# Patient Record
Sex: FEMALE | Race: WHITE | NOT HISPANIC OR LATINO
[De-identification: names, ages, dates, MRNs, and addresses within clinical notes are randomized per-mention and may not be internally consistent; named-entity substitution may affect disease eponyms.]

---

## 2017-12-20 ENCOUNTER — TRANSCRIPTION ENCOUNTER (OUTPATIENT)
Age: 62
End: 2017-12-20

## 2018-05-04 ENCOUNTER — APPOINTMENT (OUTPATIENT)
Dept: CARDIOLOGY | Facility: CLINIC | Age: 63
End: 2018-05-04

## 2018-05-04 VITALS
DIASTOLIC BLOOD PRESSURE: 65 MMHG | TEMPERATURE: 97.8 F | SYSTOLIC BLOOD PRESSURE: 111 MMHG | HEART RATE: 96 BPM | HEIGHT: 62 IN | OXYGEN SATURATION: 69 % | BODY MASS INDEX: 19.88 KG/M2 | WEIGHT: 108 LBS

## 2018-05-04 DIAGNOSIS — Z87.19 PERSONAL HISTORY OF OTHER DISEASES OF THE DIGESTIVE SYSTEM: ICD-10-CM

## 2018-05-04 DIAGNOSIS — Z87.891 PERSONAL HISTORY OF NICOTINE DEPENDENCE: ICD-10-CM

## 2018-05-04 DIAGNOSIS — Z78.9 OTHER SPECIFIED HEALTH STATUS: ICD-10-CM

## 2018-05-04 DIAGNOSIS — Z82.3 FAMILY HISTORY OF STROKE: ICD-10-CM

## 2018-05-04 DIAGNOSIS — Z86.39 PERSONAL HISTORY OF OTHER ENDOCRINE, NUTRITIONAL AND METABOLIC DISEASE: ICD-10-CM

## 2018-05-04 DIAGNOSIS — Z87.39 PERSONAL HISTORY OF OTHER DISEASES OF THE MUSCULOSKELETAL SYSTEM AND CONNECTIVE TISSUE: ICD-10-CM

## 2018-05-04 DIAGNOSIS — Z86.59 PERSONAL HISTORY OF OTHER MENTAL AND BEHAVIORAL DISORDERS: ICD-10-CM

## 2018-05-04 DIAGNOSIS — Z87.898 PERSONAL HISTORY OF OTHER SPECIFIED CONDITIONS: ICD-10-CM

## 2018-05-04 DIAGNOSIS — Z82.49 FAMILY HISTORY OF ISCHEMIC HEART DISEASE AND OTHER DISEASES OF THE CIRCULATORY SYSTEM: ICD-10-CM

## 2018-05-04 DIAGNOSIS — Z87.2 PERSONAL HISTORY OF DISEASES OF THE SKIN AND SUBCUTANEOUS TISSUE: ICD-10-CM

## 2018-05-14 PROBLEM — Z87.19 HISTORY OF IRRITABLE BOWEL SYNDROME: Status: RESOLVED | Noted: 2018-05-14 | Resolved: 2018-05-14

## 2018-05-14 PROBLEM — Z86.59 HISTORY OF ANXIETY: Status: RESOLVED | Noted: 2018-05-14 | Resolved: 2018-05-14

## 2018-05-14 PROBLEM — Z86.39 HISTORY OF GAUCHER DISEASE: Status: RESOLVED | Noted: 2018-05-14 | Resolved: 2018-05-14

## 2018-05-14 PROBLEM — Z87.891 FORMER SMOKER: Status: ACTIVE | Noted: 2018-05-14

## 2018-05-14 PROBLEM — Z87.39 HISTORY OF SCOLIOSIS: Status: RESOLVED | Noted: 2018-05-14 | Resolved: 2018-05-14

## 2018-05-14 PROBLEM — Z82.3 FAMILY HISTORY OF CEREBROVASCULAR ACCIDENT (CVA): Status: ACTIVE | Noted: 2018-05-14

## 2018-05-14 PROBLEM — Z87.2 HISTORY OF PSORIASIS: Status: RESOLVED | Noted: 2018-05-14 | Resolved: 2018-05-14

## 2018-05-14 PROBLEM — Z87.898 HISTORY OF FIBROCYSTIC DISEASE OF BREAST: Status: RESOLVED | Noted: 2018-05-14 | Resolved: 2018-05-14

## 2018-05-14 PROBLEM — Z78.9 SOCIAL ALCOHOL USE: Status: ACTIVE | Noted: 2018-05-14

## 2018-05-14 PROBLEM — Z82.49 FAMILY HISTORY OF CORONARY ARTERY DISEASE: Status: ACTIVE | Noted: 2018-05-14

## 2018-05-14 RX ORDER — ASPIRIN ENTERIC COATED TABLETS 81 MG 81 MG/1
81 TABLET, DELAYED RELEASE ORAL
Refills: 0 | Status: ACTIVE | COMMUNITY

## 2018-05-21 ENCOUNTER — NON-APPOINTMENT (OUTPATIENT)
Age: 63
End: 2018-05-21

## 2018-11-20 ENCOUNTER — APPOINTMENT (OUTPATIENT)
Dept: PHYSICAL MEDICINE AND REHAB | Facility: CLINIC | Age: 63
End: 2018-11-20
Payer: MEDICARE

## 2018-11-20 VITALS
SYSTOLIC BLOOD PRESSURE: 122 MMHG | HEIGHT: 62 IN | DIASTOLIC BLOOD PRESSURE: 84 MMHG | OXYGEN SATURATION: 97 % | WEIGHT: 106 LBS | HEART RATE: 91 BPM | BODY MASS INDEX: 19.51 KG/M2 | TEMPERATURE: 97.6 F

## 2018-11-20 PROCEDURE — 99203 OFFICE O/P NEW LOW 30 MIN: CPT

## 2019-01-14 ENCOUNTER — APPOINTMENT (OUTPATIENT)
Dept: CARDIOLOGY | Facility: CLINIC | Age: 64
End: 2019-01-14
Payer: MEDICARE

## 2019-01-14 VITALS
TEMPERATURE: 97.7 F | DIASTOLIC BLOOD PRESSURE: 76 MMHG | OXYGEN SATURATION: 100 % | HEART RATE: 78 BPM | WEIGHT: 106 LBS | SYSTOLIC BLOOD PRESSURE: 128 MMHG | BODY MASS INDEX: 19.51 KG/M2 | HEIGHT: 62 IN

## 2019-01-14 PROCEDURE — 99215 OFFICE O/P EST HI 40 MIN: CPT

## 2019-01-14 PROCEDURE — 93000 ELECTROCARDIOGRAM COMPLETE: CPT

## 2019-01-20 ENCOUNTER — NON-APPOINTMENT (OUTPATIENT)
Age: 64
End: 2019-01-20

## 2019-01-20 NOTE — PHYSICAL EXAM
[Auscultation Breath Sounds / Voice Sounds] : lungs were clear to auscultation bilaterally [Heart Sounds] : normal S1 and S2 [Bowel Sounds] : normal bowel sounds [Abdomen Soft] : soft [Cyanosis, Localized] : no localized cyanosis [] : no rash [Affect] : the affect was normal [FreeTextEntry1] : halting gait

## 2019-01-20 NOTE — HISTORY OF PRESENT ILLNESS
[FreeTextEntry1] : 63-year-old female\par Routine followup\par "I feel okay." Debra denies shortness of breath at rest, ankle edema, orthopnea, chest pain or palpitations. Dyspnea on exertion is unchanged from the past. Her main complaint is that of imbalance and decreased mobility.  The  12/18 the transthoracic echocardiogram demonstrated moderate mitral regurgitation with posterior leaflet prolapse. The mitral valve clip device was noted. Left ventricular ejection fraction was 55% .(See attached report)  Previous treatment with furosemide has been discontinued for unclear reasons possibly related to concerns for incontinence.

## 2019-02-01 ENCOUNTER — RECORD ABSTRACTING (OUTPATIENT)
Age: 64
End: 2019-02-01

## 2019-02-01 LAB — CYTOLOGY CVX/VAG DOC THIN PREP: NORMAL

## 2019-02-08 ENCOUNTER — APPOINTMENT (OUTPATIENT)
Dept: RHEUMATOLOGY | Facility: CLINIC | Age: 64
End: 2019-02-08
Payer: MEDICARE

## 2019-02-08 VITALS
DIASTOLIC BLOOD PRESSURE: 70 MMHG | BODY MASS INDEX: 19.51 KG/M2 | HEIGHT: 62 IN | SYSTOLIC BLOOD PRESSURE: 100 MMHG | WEIGHT: 106 LBS

## 2019-02-08 PROCEDURE — 36415 COLL VENOUS BLD VENIPUNCTURE: CPT

## 2019-02-08 PROCEDURE — 99213 OFFICE O/P EST LOW 20 MIN: CPT | Mod: 25

## 2019-02-09 LAB
25(OH)D3 SERPL-MCNC: 27.9 NG/ML
ALBUMIN SERPL ELPH-MCNC: 4.5 G/DL
ALP BLD-CCNC: 65 U/L
ALT SERPL-CCNC: 13 U/L
ANION GAP SERPL CALC-SCNC: 15 MMOL/L
AST SERPL-CCNC: 23 U/L
BASOPHILS # BLD AUTO: 0.04 K/UL
BASOPHILS NFR BLD AUTO: 0.8 %
BILIRUB SERPL-MCNC: 0.6 MG/DL
BUN SERPL-MCNC: 25 MG/DL
CALCIUM SERPL-MCNC: 10.2 MG/DL
CHLORIDE SERPL-SCNC: 99 MMOL/L
CO2 SERPL-SCNC: 25 MMOL/L
CREAT SERPL-MCNC: 0.74 MG/DL
EOSINOPHIL # BLD AUTO: 0.05 K/UL
EOSINOPHIL NFR BLD AUTO: 1 %
GLUCOSE SERPL-MCNC: 91 MG/DL
HCT VFR BLD CALC: 44.4 %
HGB BLD-MCNC: 13.3 G/DL
IMM GRANULOCYTES NFR BLD AUTO: 0 %
LYMPHOCYTES # BLD AUTO: 1.79 K/UL
LYMPHOCYTES NFR BLD AUTO: 35.6 %
MAN DIFF?: NORMAL
MCHC RBC-ENTMCNC: 27.5 PG
MCHC RBC-ENTMCNC: 30 GM/DL
MCV RBC AUTO: 91.7 FL
MONOCYTES # BLD AUTO: 0.33 K/UL
MONOCYTES NFR BLD AUTO: 6.6 %
NEUTROPHILS # BLD AUTO: 2.82 K/UL
NEUTROPHILS NFR BLD AUTO: 56 %
PLATELET # BLD AUTO: 167 K/UL
POTASSIUM SERPL-SCNC: 4.9 MMOL/L
PROT SERPL-MCNC: 7.4 G/DL
RBC # BLD: 4.84 M/UL
RBC # FLD: 16 %
SODIUM SERPL-SCNC: 139 MMOL/L
URATE SERPL-MCNC: 3.6 MG/DL
WBC # FLD AUTO: 5.03 K/UL

## 2019-02-09 NOTE — HISTORY OF PRESENT ILLNESS
[FreeTextEntry1] : 63 yo female here for f/u osteoporosis. She is taking calcium plus vitamin D when she remembers (about 4 times a week). She receives Prolia injections. No new fractures.  Her last Prolia 9/12/18\par A few weeks ago, she fell, and had 3 CT scans of her brain and xrays of her knees, which were negative.  She was badly bruised.  There was concern bc she was on a blood thinner.

## 2019-02-13 LAB
CALCIUM SERPL-MCNC: 10.2 MG/DL
COLLAGEN CTX SERPL-MCNC: 70 PG/ML
OSTEOCALCIN SERPL-MCNC: 11 NG/ML
PARATHYROID HORMONE INTACT: 42 PG/ML

## 2019-05-13 ENCOUNTER — NON-APPOINTMENT (OUTPATIENT)
Age: 64
End: 2019-05-13

## 2019-05-13 ENCOUNTER — APPOINTMENT (OUTPATIENT)
Dept: CARDIOLOGY | Facility: CLINIC | Age: 64
End: 2019-05-13
Payer: MEDICARE

## 2019-05-13 VITALS
BODY MASS INDEX: 19.39 KG/M2 | HEART RATE: 76 BPM | SYSTOLIC BLOOD PRESSURE: 128 MMHG | OXYGEN SATURATION: 96 % | WEIGHT: 106 LBS | DIASTOLIC BLOOD PRESSURE: 71 MMHG

## 2019-05-13 DIAGNOSIS — Z87.39 PERSONAL HISTORY OF OTHER DISEASES OF THE MUSCULOSKELETAL SYSTEM AND CONNECTIVE TISSUE: ICD-10-CM

## 2019-05-13 DIAGNOSIS — Z00.00 ENCOUNTER FOR GENERAL ADULT MEDICAL EXAMINATION W/OUT ABNORMAL FINDINGS: ICD-10-CM

## 2019-05-13 PROCEDURE — 93000 ELECTROCARDIOGRAM COMPLETE: CPT

## 2019-05-13 PROCEDURE — 99215 OFFICE O/P EST HI 40 MIN: CPT

## 2019-05-13 NOTE — PHYSICAL EXAM
[Auscultation Breath Sounds / Voice Sounds] : lungs were clear to auscultation bilaterally [Heart Sounds] : normal S1 and S2 [Bowel Sounds] : normal bowel sounds [Abdomen Soft] : soft [Cyanosis, Localized] : no localized cyanosis [] : no rash [Affect] : the affect was normal [FreeTextEntry1] : halting gait.  Bilateral upper chest /subclavicular area deep brain stimulation batteries palpable. Mild kyphosis

## 2019-05-13 NOTE — DISCUSSION/SUMMARY
[FreeTextEntry1] : \par Preoperative cardiovascular examination\par Debra underwent implantation of a deep brain stimulator for treatment of Parkinson's. Battery replacement is now recommended and planned for 5/17/19. The details of the evaluation neurological reports etc. are are not available for review. \par \par \par Comment\par There is no cardiac contraindication to the proposed surgical procedure. There has been no recent myocardial infarction, the patient is hemodynamically stable without clinical congestive heart failure. Atrial  fibrillation ventricular response is controlled.  As such, the operation may proceed with an acceptable cardiac risk.\par \par I have recommended the following:\par 1. Continue the present medical regimen\par 2. Reinstitution of aspirin as soon as feasible postoperatively as directed by Dr. Huitron\par 3  Electrocardiogram with brain stimulator turned off \par 4. Further cardiac testing is not required at this time\par 5. Workup and treatment as directed by \par \par \par Valvular heart disease\par In 12/17 Debra underwent transcatheter mitral valve repair with mitral valve clip for severe mitral regurgitation . 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 Posterior mitral annular calcification was noted. The procedure was difficult due to calcification and commissural extension to P1 and P2 prolapse. The 12/18 transthoracic echocardiogram demonstrated a reduction of severe mitral regurgitation to moderate levels. The left atrium was severely enlarged at 6.0 cm. The mitral valve clip device was noted. There was residual posterior leaflet prolapse. Moderate pulmonary hypertension was reflected by a pulmonary artery systolic pressure of 50  mmHg. The left ventricle ejection fraction was 55%. The present cardiac examination is consistent with this degree of mitral regurgitation and a euvolemic state New York Heart Association class II .Debra is at increased risk for pulmonary venous congestion.Low-dose diuretics  were not tolerated.\par \par I have recommended the following:\par 1.  continue the present medical regimen \par 2   Antibiotic prophylaxis for appropriate dental and surgical procedures\par 3   Echocardiogram with next office evaluation in 6 months\par 4. Followup with Dr. KATHRYN Aponte [facility]\par \par \par Atrial fibrillation\par The working diagnosis is chronic atrial fibrillation secondary to valvular ( mitral regurgitation) heart disease. Atrial fibrillation was first detected in 5/17 on a routine electrocardiogram. The last electrocardiogram demonstrating sinus rhythm was in 5/14. As such, the duration of atrial fibrillation is uncertain. The 12/18 echocardiogram demonstrated moderate mitral insufficiency with severe left atrial dilatation 6.0 cm) and severe increased volume index (363 mL). The duration of atrial fibrillation ,  degree of mitral disease coupled with the left atrial enlargement/increased volume would indicate a low likelihood of successful restoration and maintenance of sinus rhythm. Adequate rate control of the ventricular response appears to be achieved without the administration of AV osmin blocking agents . 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 A low "[id number]" score and concerns for the potential of trauma/falling make the risk of anticoagulation outweigh its potential benefit.\par \par I have recommended the following:\par 1. No attempt to restore/maintain sinus rhythm\par 2 Holter monitor/event recorder later 2019 to document adequate control of the ventricular response\par 3. Addition of beta blockers if required to control the ventricular response\par \par \par \par Hyperlipidemia\par Hyperlipidemia represents a risk factor for the development of atherosclerotic heart disease. However there is no evidence of such to date. A 10/05 stress sestamibi study was normal. The ejection fraction was 83%. A  2017 CT coronary angiogram prior to mitral valve clipping procedure for severe mitral regurgitation was normal. The target LDL level for primary prevention is about 100. The most recent lipid levels are not available for review. Nonpharmacologic therapy, specifically diet and exercise to the extent possible are emphasized as major aspects of treatment.\par \par I have recommended the following:\par 1 low-salt low-fat low-cholesterol diet. Regular aerobic exercise to the extent possible\par 2 laboratory studies including lipid profile through primary care Dr. PRIETO Aguero\par 3. Target LDL level to about 100 as discussed above

## 2019-05-13 NOTE — REVIEW OF SYSTEMS
[Feeling Fatigued] : feeling fatigued [see HPI] : see HPI [Negative] : Integumentary [Joint Pain] : no joint pain

## 2019-05-13 NOTE — HISTORY OF PRESENT ILLNESS
[FreeTextEntry1] : 64-year-old female\par Preoperative cardiovascular examination prior to battery  replacement of deep brain stimulator\par \par Debra previously underwent implantation of a deep brain stimulator for treatment of Parkinson's. Battery replacement is now recommended and planned for 5/17/19. The details of the evaluation neurological reports etc. are are not available for review. \par \par Mrs. Trujillo denies symptoms of palpitations shortness of breath ankle edema orthopnea or syncope.

## 2019-11-08 ENCOUNTER — APPOINTMENT (OUTPATIENT)
Dept: RHEUMATOLOGY | Facility: CLINIC | Age: 64
End: 2019-11-08
Payer: MEDICARE

## 2019-11-08 VITALS
BODY MASS INDEX: 19.51 KG/M2 | DIASTOLIC BLOOD PRESSURE: 62 MMHG | WEIGHT: 106 LBS | HEIGHT: 62 IN | SYSTOLIC BLOOD PRESSURE: 118 MMHG

## 2019-11-08 PROCEDURE — 36415 COLL VENOUS BLD VENIPUNCTURE: CPT

## 2019-11-08 PROCEDURE — 99213 OFFICE O/P EST LOW 20 MIN: CPT | Mod: 25

## 2019-11-11 NOTE — HISTORY OF PRESENT ILLNESS
[FreeTextEntry1] : 65 yo female here for f/u osteoporosis. She has not been taking calcium plus vitamin D.  She receives Prolia injections. Her last Prolia was 7 months ago.  No new fractures.

## 2019-11-11 NOTE — HISTORY OF PRESENT ILLNESS
[FreeTextEntry1] : 63 yo female here for f/u osteoporosis. She has not been taking calcium plus vitamin D.  She receives Prolia injections. Her last Prolia was 7 months ago.  No new fractures.

## 2019-11-12 ENCOUNTER — APPOINTMENT (OUTPATIENT)
Dept: CARDIOLOGY | Facility: CLINIC | Age: 64
End: 2019-11-12

## 2019-11-21 ENCOUNTER — APPOINTMENT (OUTPATIENT)
Dept: CARDIOLOGY | Facility: CLINIC | Age: 64
End: 2019-11-21
Payer: MEDICARE

## 2019-11-21 PROCEDURE — 93306 TTE W/DOPPLER COMPLETE: CPT

## 2019-11-30 LAB
25(OH)D3 SERPL-MCNC: 27.1 NG/ML
ALBUMIN SERPL ELPH-MCNC: 4.7 G/DL
ALP BLD-CCNC: 67 U/L
ALT SERPL-CCNC: 18 U/L
ANION GAP SERPL CALC-SCNC: 12 MMOL/L
AST SERPL-CCNC: 25 U/L
BASOPHILS # BLD AUTO: 0.05 K/UL
BASOPHILS NFR BLD AUTO: 1 %
BILIRUB SERPL-MCNC: 0.6 MG/DL
BUN SERPL-MCNC: 20 MG/DL
CALCIUM SERPL-MCNC: 9.9 MG/DL
CALCIUM SERPL-MCNC: 9.9 MG/DL
CHLORIDE SERPL-SCNC: 102 MMOL/L
CO2 SERPL-SCNC: 26 MMOL/L
COLLAGEN CTX SERPL-MCNC: 469 PG/ML
CREAT SERPL-MCNC: 0.67 MG/DL
EOSINOPHIL # BLD AUTO: 0.07 K/UL
EOSINOPHIL NFR BLD AUTO: 1.4 %
GLUCOSE SERPL-MCNC: 79 MG/DL
HCT VFR BLD CALC: 43.5 %
HGB BLD-MCNC: 13.6 G/DL
IMM GRANULOCYTES NFR BLD AUTO: 0.2 %
LYMPHOCYTES # BLD AUTO: 1.77 K/UL
LYMPHOCYTES NFR BLD AUTO: 34.2 %
MAN DIFF?: NORMAL
MCHC RBC-ENTMCNC: 29.2 PG
MCHC RBC-ENTMCNC: 31.3 GM/DL
MCV RBC AUTO: 93.5 FL
MONOCYTES # BLD AUTO: 0.36 K/UL
MONOCYTES NFR BLD AUTO: 6.9 %
NEUTROPHILS # BLD AUTO: 2.92 K/UL
NEUTROPHILS NFR BLD AUTO: 56.3 %
OSTEOCALCIN SERPL-MCNC: 16 NG/ML
PARATHYROID HORMONE INTACT: 48 PG/ML
PLATELET # BLD AUTO: 165 K/UL
POTASSIUM SERPL-SCNC: 4.7 MMOL/L
PROT SERPL-MCNC: 7.3 G/DL
RBC # BLD: 4.65 M/UL
RBC # FLD: 15.9 %
SODIUM SERPL-SCNC: 140 MMOL/L
URATE SERPL-MCNC: 3.9 MG/DL
WBC # FLD AUTO: 5.18 K/UL

## 2020-01-01 ENCOUNTER — TRANSCRIPTION ENCOUNTER (OUTPATIENT)
Age: 65
End: 2020-01-01

## 2020-01-07 ENCOUNTER — INBOUND DOCUMENT (OUTPATIENT)
Age: 65
End: 2020-01-07

## 2020-02-03 ENCOUNTER — APPOINTMENT (OUTPATIENT)
Dept: CARDIOLOGY | Facility: CLINIC | Age: 65
End: 2020-02-03
Payer: MEDICARE

## 2020-02-03 ENCOUNTER — NON-APPOINTMENT (OUTPATIENT)
Age: 65
End: 2020-02-03

## 2020-02-03 VITALS
BODY MASS INDEX: 18.84 KG/M2 | OXYGEN SATURATION: 99 % | HEART RATE: 87 BPM | WEIGHT: 103 LBS | DIASTOLIC BLOOD PRESSURE: 74 MMHG | SYSTOLIC BLOOD PRESSURE: 102 MMHG

## 2020-02-03 DIAGNOSIS — M19.90 UNSPECIFIED OSTEOARTHRITIS, UNSPECIFIED SITE: ICD-10-CM

## 2020-02-03 DIAGNOSIS — Z87.01 PERSONAL HISTORY OF PNEUMONIA (RECURRENT): ICD-10-CM

## 2020-02-03 DIAGNOSIS — Z86.59 PERSONAL HISTORY OF OTHER MENTAL AND BEHAVIORAL DISORDERS: ICD-10-CM

## 2020-02-03 DIAGNOSIS — Z86.39 PERSONAL HISTORY OF OTHER ENDOCRINE, NUTRITIONAL AND METABOLIC DISEASE: ICD-10-CM

## 2020-02-03 DIAGNOSIS — Z63.4 DISAPPEARANCE AND DEATH OF FAMILY MEMBER: ICD-10-CM

## 2020-02-03 PROCEDURE — 99215 OFFICE O/P EST HI 40 MIN: CPT

## 2020-02-03 PROCEDURE — 93000 ELECTROCARDIOGRAM COMPLETE: CPT

## 2020-02-03 SDOH — SOCIAL STABILITY - SOCIAL INSECURITY: DISSAPEARANCE AND DEATH OF FAMILY MEMBER: Z63.4

## 2020-02-04 PROBLEM — M19.90 OSTEOARTHRITIS: Status: RESOLVED | Noted: 2020-02-04 | Resolved: 2020-02-04

## 2020-02-04 PROBLEM — Z86.59 HISTORY OF DEMENTIA: Status: RESOLVED | Noted: 2020-02-04 | Resolved: 2020-02-04

## 2020-02-04 NOTE — PHYSICAL EXAM
[Auscultation Breath Sounds / Voice Sounds] : lungs were clear to auscultation bilaterally [Heart Sounds] : normal S1 and S2 [Bowel Sounds] : normal bowel sounds [Abdomen Soft] : soft [] : no rash [Cyanosis, Localized] : no localized cyanosis [Affect] : the affect was normal [FreeTextEntry1] : halting gait.  Bilateral upper chest /subclavicular area deep brain stimulation batteries palpable. Moderate kyphosis /scoliosis

## 2020-02-04 NOTE — HISTORY OF PRESENT ILLNESS
[FreeTextEntry1] : 64-year-old female\par Routine followup of hospital\par 1/20 hospitalization  for pneumonia responded to antibiotic therapy. Subsequently emergency room evaluation for head trauma requiring suturing of a laceration. Head CAT scan negative. ( See hospital reports/medical records.)\par \par Presently Debra complains of positional chest discomfort which she attributes to the deep brain stimulation pulse generator. Moving her chest from side to side causes discomfort. No exertional chest pain. No shortness of breath at rest. No ankle edema. No orthopnea. No syncope.

## 2020-02-04 NOTE — DISCUSSION/SUMMARY
[FreeTextEntry1] : Valvular heart disease\par In 12/17 Debra underwent transcatheter mitral valve repair with mitral valve clip for severe mitral regurgitation . 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 Posterior mitral annular calcification was noted. The procedure was difficult due to calcification and commissural extension to P1 and P2 prolapse. The 1/20  transthoracic echocardiogram demonstrated a reduction of severe mitral regurgitation to mild-moderate. The mitral valve clip was in place and appeared stable. Residual posterior mitral valve leaflet prolapse was noted. The left atrium was at 4.8 cm The left atrial volume was markedly increased at 160 mL/M2  The pulmonary artery systolic pressure was 35 mmHg. The left ventricular ejection fraction was 60%.  The present cardiac examination is consistent with this degree of mitral regurgitation and a euvolemic state New York Heart Association class II .Debra is at increased risk for pulmonary venous congestion.Low-dose diuretics  were not tolerated.\par \par I have recommended the following:\par 1.  continue the present medical regimen \par 2   Antibiotic prophylaxis for appropriate dental and surgical procedures\par 3   Follow  up with  Dr. KATHRYN Aponte [facility]\par \par \par \par Chest pain\par The working diagnosis is musculoskeletal chest pain related to the presence of the deep brain stimulator generator. The history is compelling for this diagnosis. The differential diagnosis would include myocardial ischemia/atherosclerotic heart disease. However, a  2017 CT coronary angiogram was normal. The resting 2/03/20 electrocardiogram although technically limited fails to demonstrate any evidence of myocardial ischemia or infarction.\par \par I have recommended the following\par 1. No further cardiac testing for this problem at this time.\par \par \par Atrial fibrillation\par The working diagnosis is chronic atrial fibrillation secondary to valvular ( mitral regurgitation) heart disease. Atrial fibrillation was first detected in 5/17 on a routine electrocardiogram. The last electrocardiogram demonstrating sinus rhythm was in 5/14. As such, the duration of atrial fibrillation is uncertain. The 12/18 echocardiogram demonstrated moderate mitral insufficiency with severe left atrial dilatation 6.0 cm) and severe increased volume index (363 mL). The duration of atrial fibrillation ,  degree of mitral disease coupled with the left atrial enlargement/increased volume would indicate a low likelihood of successful restoration and maintenance of sinus rhythm. Adequate rate control of the ventricular response appears to be achieved without the administration of AV osmin blocking agents . 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 A low "[id number]" score and concerns for the potential of trauma/falling make the risk of anticoagulation outweigh its potential benefit.\par \par I have recommended the following:\par 1. No attempt to restore/maintain sinus rhythm\par 2 Holter monitor/event recorder later 2020 to document adequate control of the ventricular response\par 3. Addition of beta blockers if required to control the ventricular response\par \par \par \par Hyperlipidemia\par Hyperlipidemia represents a risk factor for the development of atherosclerotic heart disease. However there is no evidence of such to date. A 10/05 stress sestamibi study was normal. The ejection fraction was 83%. A  2017 CT coronary angiogram prior to mitral valve clipping procedure for severe mitral regurgitation was normal. The target LDL level for primary prevention is about 100. The most recent lipid levels are not available for review. Nonpharmacologic therapy, specifically diet and exercise to the extent possible are emphasized as major aspects of treatment.\par \par I have recommended the following:\par 1 low-salt low-fat low-cholesterol diet. Regular aerobic exercise to the extent possible\par 2 laboratory studies including lipid profile through primary care Dr. PRIETO Aguero\par 3. Target LDL level to about 100 as discussed above\par \par \par The diagnosis, prognosis, risks, options and alternatives were explained at length to the patient. All questions were answered. Issues discussed included chest pain, atrial fibrillation, valvular heart disease and Parkinson's disease.\par \par Counseling and/or coordination of care\par Time was a significant factor for this patient encounter. Total time spent with the patient was 40 minutes. 50% of the time was devoted to counseling and/or coordination of care.

## 2020-07-08 ENCOUNTER — APPOINTMENT (OUTPATIENT)
Dept: RHEUMATOLOGY | Facility: CLINIC | Age: 65
End: 2020-07-08
Payer: MEDICARE

## 2020-07-08 PROCEDURE — 99213 OFFICE O/P EST LOW 20 MIN: CPT | Mod: 25

## 2020-07-08 PROCEDURE — 36415 COLL VENOUS BLD VENIPUNCTURE: CPT

## 2020-07-09 LAB
25(OH)D3 SERPL-MCNC: 35 NG/ML
ALBUMIN SERPL ELPH-MCNC: 5.1 G/DL
ALP BLD-CCNC: 66 U/L
ALT SERPL-CCNC: 20 U/L
ANION GAP SERPL CALC-SCNC: 12 MMOL/L
AST SERPL-CCNC: 33 U/L
BASOPHILS # BLD AUTO: 0.04 K/UL
BASOPHILS NFR BLD AUTO: 0.7 %
BILIRUB SERPL-MCNC: 0.7 MG/DL
BUN SERPL-MCNC: 23 MG/DL
CALCIUM SERPL-MCNC: 10.2 MG/DL
CALCIUM SERPL-MCNC: 10.2 MG/DL
CHLORIDE SERPL-SCNC: 101 MMOL/L
CO2 SERPL-SCNC: 26 MMOL/L
CREAT SERPL-MCNC: 0.71 MG/DL
EOSINOPHIL # BLD AUTO: 0.06 K/UL
EOSINOPHIL NFR BLD AUTO: 1.1 %
GLUCOSE SERPL-MCNC: 92 MG/DL
HCT VFR BLD CALC: 46.7 %
HGB BLD-MCNC: 14.2 G/DL
IMM GRANULOCYTES NFR BLD AUTO: 0.2 %
LYMPHOCYTES # BLD AUTO: 1.87 K/UL
LYMPHOCYTES NFR BLD AUTO: 34.1 %
MAN DIFF?: NORMAL
MCHC RBC-ENTMCNC: 28.9 PG
MCHC RBC-ENTMCNC: 30.4 GM/DL
MCV RBC AUTO: 95.1 FL
MONOCYTES # BLD AUTO: 0.36 K/UL
MONOCYTES NFR BLD AUTO: 6.6 %
NEUTROPHILS # BLD AUTO: 3.15 K/UL
NEUTROPHILS NFR BLD AUTO: 57.3 %
PARATHYROID HORMONE INTACT: 64 PG/ML
PLATELET # BLD AUTO: 161 K/UL
POTASSIUM SERPL-SCNC: 4.6 MMOL/L
PROT SERPL-MCNC: 7.6 G/DL
RBC # BLD: 4.91 M/UL
RBC # FLD: 16.3 %
SODIUM SERPL-SCNC: 139 MMOL/L
URATE SERPL-MCNC: 3.4 MG/DL
WBC # FLD AUTO: 5.49 K/UL

## 2020-07-09 NOTE — HISTORY OF PRESENT ILLNESS
[FreeTextEntry1] : 63 yo female here for f/u osteoporosis. She has been taking calcium plus vitamin D.  She receives Prolia injections. She is 2 months overdue for her Prolia.  No new fractures.  No falls.\par \par Her memory continues to deteriorate due to Parkinsons.  She follows with a cognitive specialist.

## 2020-07-11 LAB
COLLAGEN CTX SERPL-MCNC: 76 PG/ML
OSTEOCALCIN SERPL-MCNC: 11 NG/ML

## 2020-09-23 ENCOUNTER — APPOINTMENT (OUTPATIENT)
Dept: CARDIOLOGY | Facility: CLINIC | Age: 65
End: 2020-09-23
Payer: MEDICARE

## 2020-09-23 ENCOUNTER — APPOINTMENT (OUTPATIENT)
Dept: CARDIOLOGY | Facility: CLINIC | Age: 65
End: 2020-09-23

## 2020-09-23 VITALS
BODY MASS INDEX: 19.2 KG/M2 | WEIGHT: 105 LBS | OXYGEN SATURATION: 98 % | SYSTOLIC BLOOD PRESSURE: 98 MMHG | DIASTOLIC BLOOD PRESSURE: 80 MMHG | TEMPERATURE: 97.3 F | HEART RATE: 97 BPM

## 2020-09-23 PROCEDURE — 99215 OFFICE O/P EST HI 40 MIN: CPT

## 2020-09-24 RX ORDER — CHOLECALCIFEROL (VITAMIN D3) 25 MCG
TABLET ORAL
Refills: 0 | Status: ACTIVE | COMMUNITY

## 2020-09-24 RX ORDER — LEVODOPA AND CARBIDOPA 145; 36.25 MG/1; MG/1
36.25-145 CAPSULE, EXTENDED RELEASE ORAL
Refills: 0 | Status: ACTIVE | COMMUNITY

## 2020-09-24 NOTE — PHYSICAL EXAM
[Auscultation Breath Sounds / Voice Sounds] : lungs were clear to auscultation bilaterally [Heart Sounds] : normal S1 and S2 [Bowel Sounds] : normal bowel sounds [Abdomen Soft] : soft [Cyanosis, Localized] : no localized cyanosis [] : no rash [Affect] : the affect was normal [FreeTextEntry1] : halting gait.  Bilateral upper chest /subclavicular area deep brain stimulation batteries palpable. Moderate kyphosis /scoliosis

## 2020-09-24 NOTE — HISTORY OF PRESENT ILLNESS
[FreeTextEntry1] : 65-year-old female\par Unscheduled visit\par \steph Richardson was seen in the emergency room on 9/19/20 because of neck and chest tightness thought to be musculoskeletal in origin. A CAT scan of the chest was consistent with mild pulmonary venous congestion. (See hospital records.)\par \steph Richardson complains of dyspnea on exertion which she feels is new over the last several weeks. No ankle edema. No orthopnea. No palpitations. No syncope.

## 2020-09-24 NOTE — DISCUSSION/SUMMARY
[FreeTextEntry1] : Shortness of breath\par The working diagnosis is dyspnea secondary to congestive heart failure with preserved ejection fraction (60% 1/20 echocardiogram).  Underlying valvular heart disease/mitral regurgitation may  be responsible for the development of pulmonary venous congestion. The 9/20 chest CAT scan and and the  9/20 BNP level  elevated at 1426  are  consistent with this diagnosis.. The differential diagnosis would include dyspnea  secondary to increased energy requirements for exertion related to neurological issues. The present physical examination and the 9/20 chest x-ray  are  not suggestive of congestive heart failure.\par Prior administration of Lasix was not tolerated\par \par I have recommended the following\par 1. Torsemide 20 mg/day\par 2. Transthoracic echocardiogram\par 3 consideration for transesophageal echocardiogram dependent on the above evaluation and the  patient's clinical course\par \par \par Valvular heart disease\par In 12/17 Debra underwent transcatheter mitral valve repair with mitral valve clip for severe mitral regurgitation . 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 Posterior mitral annular calcification was noted. The procedure was difficult due to calcification and commissural extension to P1 and P2 prolapse. The 1/20  transthoracic echocardiogram demonstrated a reduction of severe mitral regurgitation to mild-moderate. The mitral valve clip was in place and appeared stable. Residual posterior mitral valve leaflet prolapse was noted. The left atrium was at 4.8 cm The left atrial volume was markedly increased at 160 mL/M2  The pulmonary artery systolic pressure was 35 mmHg. The left ventricular ejection fraction was 60%.  The present cardiac examination is consistent with this degree of mitral regurgitation and a euvolemic state New York Heart Association class II .Debra is at increased risk for pulmonary venous congestion.Low-dose diuretics  were not tolerated.\par \par I have recommended the following:\par 1.  continue the present medical regimen \par 2   Antibiotic prophylaxis for appropriate dental and surgical procedures\par 3. Transthoracic  echocardiogram\par 4 transesophageal echocardiogram dependent on the above and patient's clinical course\par 5   Follow  up with  Dr. KATHRYN Aponte [facility]\par \par \par \par Chest pain\par The working diagnosis is musculoskeletal chest pain related to the presence of the deep brain stimulator generator. The history is compelling for this diagnosis. The differential diagnosis would include myocardial ischemia/atherosclerotic heart disease. However, a  2017 CT coronary angiogram was normal. .\par \par I have recommended the following\par 1. No further cardiac testing for this problem at this time.\par \par \par Atrial fibrillation\par The working diagnosis is chronic atrial fibrillation secondary to valvular ( mitral regurgitation) heart disease. Atrial fibrillation was first detected in 5/17 on a routine electrocardiogram. The last electrocardiogram demonstrating sinus rhythm was in 5/14. As such, the duration of atrial fibrillation is uncertain. The 12/18 echocardiogram demonstrated moderate mitral insufficiency with severe left atrial dilatation 6.0 cm) and severe increased volume index (363 mL). The duration of atrial fibrillation ,  degree of mitral disease coupled with the left atrial enlargement/increased volume would indicate a low likelihood of successful restoration and maintenance of sinus rhythm. Adequate rate control of the ventricular response appears to be achieved without the administration of AV osmin blocking agents . 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 A low "[id number]" score and concerns for the potential of trauma/falling make the risk of anticoagulation outweigh its potential benefit.\par \par I have recommended the following:\par 1. No attempt to restore/maintain sinus rhythm\par 2 Holter monitor/event recorder  2021 to document adequate control of the ventricular response\par 3. Addition of beta blockers if required to control the ventricular response\par \par \par \par Hyperlipidemia\par Hyperlipidemia represents a risk factor for the development of atherosclerotic heart disease. However there is no evidence of such to date. A 10/05 stress sestamibi study was normal. The ejection fraction was 83%. A  2017 CT coronary angiogram prior to mitral valve clipping procedure for severe mitral regurgitation was normal. The target LDL level for primary prevention is about 100. The most recent lipid levels are not available for review. Nonpharmacologic therapy, specifically diet and exercise to the extent possible are emphasized as major aspects of treatment.\par \par I have recommended the following:\par 1 low-salt low-fat low-cholesterol diet. Regular aerobic exercise to the extent possible\par 2 laboratory studies including lipid profile through primary care Dr. PRIETO Aguero\par 3. Target LDL level to about 100 as discussed above\par \par \par The diagnosis, prognosis, risks, options and alternatives were explained at length to the patient. All questions were answered. Issues discussed included chest pain, atrial fibrillation, valvular heart disease  Parkinson's disease dyspnea and noninvasive cardiac testing.\par \par Counseling and/or coordination of care\par Time was a significant factor for this patient encounter. Total time spent with the patient was 40 minutes. 50% of the time was devoted to counseling and/or coordination of care.

## 2020-09-30 ENCOUNTER — APPOINTMENT (OUTPATIENT)
Dept: CARDIOLOGY | Facility: CLINIC | Age: 65
End: 2020-09-30

## 2020-10-05 ENCOUNTER — APPOINTMENT (OUTPATIENT)
Dept: CARDIOLOGY | Facility: CLINIC | Age: 65
End: 2020-10-05
Payer: MEDICARE

## 2020-10-05 PROCEDURE — 99442: CPT | Mod: 95

## 2020-10-13 ENCOUNTER — APPOINTMENT (OUTPATIENT)
Dept: CARDIOLOGY | Facility: CLINIC | Age: 65
End: 2020-10-13
Payer: MEDICARE

## 2020-10-13 ENCOUNTER — NON-APPOINTMENT (OUTPATIENT)
Age: 65
End: 2020-10-13

## 2020-10-13 PROCEDURE — 93306 TTE W/DOPPLER COMPLETE: CPT

## 2020-10-26 ENCOUNTER — APPOINTMENT (OUTPATIENT)
Dept: CARDIOLOGY | Facility: CLINIC | Age: 65
End: 2020-10-26
Payer: MEDICARE

## 2020-10-26 VITALS
HEART RATE: 88 BPM | OXYGEN SATURATION: 97 % | BODY MASS INDEX: 19.2 KG/M2 | WEIGHT: 105 LBS | SYSTOLIC BLOOD PRESSURE: 88 MMHG | TEMPERATURE: 97 F | DIASTOLIC BLOOD PRESSURE: 60 MMHG

## 2020-10-26 PROCEDURE — 99215 OFFICE O/P EST HI 40 MIN: CPT

## 2020-10-27 NOTE — DISCUSSION/SUMMARY
[FreeTextEntry1] : Shortness of breath\par The working diagnosis is dyspnea secondary to congestive heart failure with preserved ejection fraction (57% 10/20 echocardiogram).  Underlying valvular heart disease/mitral regurgitation is likely to  be responsible for the development of pulmonary venous congestion. The 9/20 chest CAT scan and and the  9/20 BNP level  elevated at 1426  are  consistent with this diagnosis.. The differential diagnosis would include dyspnea  secondary to increased energy requirements for exertion related to neurological issues. The present physical examination and the 9/20 chest x-ray  are  not suggestive of congestive heart failure.\par Prior administration of Lasix was not tolerated.Torsemide appears to be adequately tolerated at the present time\par Dr. Aponte/[facility]  felt that due to unfavorable anatomy she would not be a candidate for a repeat mitral valve clipping procedure.\par Debra's intolerance to medical interventions for heart failure and the severity of mitral regurgitation makes treatment challenging\par \par I have recommended the following\par 1.Dr. HARMAN Vargas /Structural heart disease [facility] "second opinion "\par 2 consideration for  transesophageal echocardiogram dependent on the above evaluation and the  patient's clinical course\par \par \par \par \par Valvular heart disease\par In 12/17 Debra underwent transcatheter mitral valve repair with mitral valve clip for severe mitral regurgitation . 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 Posterior mitral annular calcification was noted. The procedure was difficult due to calcification and commissural extension to P1 and P2 prolapse. The 10/20   transthoracic echocardiogram demonstrated  moderate -severe mitral regurgitation.. The mitral valve clip was in place and appeared stable. Residual posterior mitral valve leaflet prolapse was noted. The left atrium was 7.9 cm The left atrial volume was markedly increased at 198 mL/M2   Moderate pulmonary hypertension was noted. The pulmonary artery systolic pressure was 57 mmHg. The left ventricular ejection fraction was 57 %.  The present cardiac examination is consistent with this degree of mitral regurgitation and a euvolemic state New York Heart Association class II .Debra is at increased risk for pulmonary venous congestion.Low-dose diuretics  were not tolerated.\par \par I have recommended the following:\par 1.  continue the present medical regimen \par 2   Antibiotic prophylaxis for appropriate dental and surgical procedures\par 3. DIMA Vargas Structural heart disease [facility] second opinion as discussed above\par 4   consideration for  transesophageal echocardiogram dependent on the above and patient's clinical course\par l\par \par \par \par \par \par Atrial fibrillation\par The working diagnosis is chronic atrial fibrillation secondary to valvular ( mitral regurgitation) heart disease. Atrial fibrillation was first detected in 5/17 on a routine electrocardiogram. The last electrocardiogram demonstrating sinus rhythm was in 5/14. As such, the duration of atrial fibrillation is uncertain. The 12/18 echocardiogram demonstrated moderate mitral insufficiency with severe left atrial dilatation 6.0 cm) and severe increased volume index (363 mL). The duration of atrial fibrillation ,  degree of mitral disease coupled with the left atrial enlargement/increased volume would indicate a low likelihood of successful restoration and maintenance of sinus rhythm. Adequate rate control of the ventricular response appears to be achieved without the administration of AV osmin blocking agents . 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 A low "[id number]" score and concerns for the potential of trauma/falling make the risk of anticoagulation outweigh its potential benefit.\par \par I have recommended the following:\par 1.Continue present medical regimen\par 2 Holter monitor/event recorder  2021 to document adequate control of the ventricular response\par 3. Addition of beta blockers if required to control the ventricular response\par \par \par \par Hyperlipidemia\par Hyperlipidemia represents a risk factor for the development of atherosclerotic heart disease. However there is no evidence of such to date. A 10/05 stress sestamibi study was normal. The ejection fraction was 83%. A  2017 CT coronary angiogram prior to mitral valve clipping procedure for severe mitral regurgitation was normal. The target LDL level for primary prevention is about 100. The most recent lipid levels are not available for review. Nonpharmacologic therapy, specifically diet and exercise to the extent possible are emphasized as major aspects of treatment.\par \par I have recommended the following:\par 1 low-salt low-fat low-cholesterol diet. Regular aerobic exercise to the extent possible\par 2 laboratory studies including lipid profile through primary care Dr. PRIETO Aguero\par 3. Target LDL level to about 100 as discussed above\par \par \par The diagnosis, prognosis, risks, options and alternatives were explained at length to the patient and her family (son and sister). All questions were answered. Issues discussed included  dyspnea , atrial fibrillation, valvular heart disease  Parkinson's disease   noninvasive cardiac testing  and surgical/nonsurgical treatments for mitral regurgitation\par \par Counseling and/or coordination of care\par Time was a significant factor for this patient encounter. Total time spent with the patient was 40 minutes. 50% of the time was devoted to counseling and/or coordination of care.

## 2020-10-27 NOTE — HISTORY OF PRESENT ILLNESS
[FreeTextEntry1] : 65-year-old female\par Routine followup\par \par Debra was evaluated at [facility]/Dr. Aponte. Repeat mitral valve clipping procedures were not felt to be feasible. Dyspnea on exertion may be slightly improved now following the administration of diuretic therapy. Breath complains of profound fatigue\par \par Ms. Wilkerson is accompanied today by her sister

## 2020-11-05 ENCOUNTER — APPOINTMENT (OUTPATIENT)
Dept: CARDIOLOGY | Facility: CLINIC | Age: 65
End: 2020-11-05

## 2020-11-10 ENCOUNTER — APPOINTMENT (OUTPATIENT)
Dept: CARDIOLOGY | Facility: CLINIC | Age: 65
End: 2020-11-10

## 2020-11-20 ENCOUNTER — APPOINTMENT (OUTPATIENT)
Dept: CARDIOTHORACIC SURGERY | Facility: CLINIC | Age: 65
End: 2020-11-20
Payer: MEDICARE

## 2020-11-20 ENCOUNTER — NON-APPOINTMENT (OUTPATIENT)
Age: 65
End: 2020-11-20

## 2020-11-20 VITALS
WEIGHT: 101 LBS | SYSTOLIC BLOOD PRESSURE: 92 MMHG | BODY MASS INDEX: 18.47 KG/M2 | HEART RATE: 103 BPM | TEMPERATURE: 97 F | DIASTOLIC BLOOD PRESSURE: 78 MMHG | OXYGEN SATURATION: 97 %

## 2020-11-20 PROCEDURE — 99204 OFFICE O/P NEW MOD 45 MIN: CPT

## 2020-11-25 NOTE — HISTORY OF PRESENT ILLNESS
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 The procedure was difficult due to calcification and commissural extension to P1 and P2 prolapse and one clip was placed. Prior to the MitraClip procedure, the patient does not recall have any significant shortness of breath with exertion and also denies any CHF admissions. After the procedure, the patient states it took about two months for her to return to her previous activity level. \par \par From a cardiology standpoint, the patient was doing well until September when she began to experience shortness of breath with exertion. She was admitted to [facility] that month and started on diuretics. Since then, she continues to experience SOB with exertion. The patient denies chest pain, orthopnea, PND, dizziness, syncope, LE edema and palpitations. She recently followed up with Dr. Aponte at [facility] regarding further treatment of her valve disease and was told it would be high risk for a second intervention. \par \par From a Parkinson's perspective, the patient has had some decline over the past several months. She does not use a cane or walker for ambulation as she feels it makes walking more cumbersome. She has a HHA 24/7 that assists with dressing and bathing. About two weeks ago, she had a fall at home after becoming unbalanced and hit her back; she denies hitting her head.\par \par Neurologist Dr. Susan Richardson [facility] \par Local Neurologist Dr. Sarah Mendoza

## 2020-11-25 NOTE — PHYSICAL EXAM
[General Appearance - In No Acute Distress] : no acute distress [Normal Conjunctiva] : the conjunctiva exhibited no abnormalities [Normal Oral Mucosa] : normal oral mucosa [Normal Jugular Venous V Waves Present] : normal jugular venous V waves present [Heart Rate And Rhythm] : heart rate and rhythm were normal [Heart Sounds] : normal S1 and S2 [Edema] : no peripheral edema present [] : no respiratory distress [Respiration, Rhythm And Depth] : normal respiratory rhythm and effort [Exaggerated Use Of Accessory Muscles For Inspiration] : no accessory muscle use [Auscultation Breath Sounds / Voice Sounds] : lungs were clear to auscultation bilaterally [Bowel Sounds] : normal bowel sounds [Abdomen Soft] : soft [Abdomen Tenderness] : non-tender [Nail Clubbing] : no clubbing of the fingernails [Cyanosis, Localized] : no localized cyanosis [Skin Color & Pigmentation] : normal skin color and pigmentation [Oriented To Time, Place, And Person] : oriented to person, place, and time [FreeTextEntry1] : Shuffling gait.

## 2020-11-25 NOTE — REVIEW OF SYSTEMS
[Feeling Fatigued] : feeling fatigued [Dyspnea on exertion] : dyspnea during exertion [Joint Stiffness] : joint stiffness [Tremor] : a tremor was seen [Negative] : Psychiatric [Fever] : no fever [Headache] : no headache [Chills] : no chills [Shortness Of Breath] : no shortness of breath [Chest  Pressure] : no chest pressure [Chest Pain] : no chest pain [Lower Ext Edema] : no extremity edema [Leg Claudication] : no intermittent leg claudication [Palpitations] : no palpitations [Joint Pain] : no joint pain [Joint Swelling] : no joint swelling [Muscle Cramps] : no muscle cramps [Limb Weakness (Paresis)] : no limb weakness [Dizziness] : no dizziness [Numbness (Hypesthesia)] : no numbness [Convulsions] : no convulsions [Tingling (Paresthesia)] : no tingling

## 2020-11-30 ENCOUNTER — APPOINTMENT (OUTPATIENT)
Dept: CARDIOLOGY | Facility: CLINIC | Age: 65
End: 2020-11-30

## 2020-12-30 ENCOUNTER — APPOINTMENT (OUTPATIENT)
Dept: CARDIOLOGY | Facility: CLINIC | Age: 65
End: 2020-12-30
Payer: MEDICARE

## 2020-12-30 PROCEDURE — 99442: CPT | Mod: 95

## 2021-01-06 ENCOUNTER — APPOINTMENT (OUTPATIENT)
Dept: RHEUMATOLOGY | Facility: CLINIC | Age: 66
End: 2021-01-06
Payer: MEDICARE

## 2021-01-06 PROCEDURE — 99443: CPT | Mod: 95

## 2021-01-13 NOTE — HISTORY OF PRESENT ILLNESS
[Home] : at home, [unfilled] , at the time of the visit. [Other Location: e.g. Home (Enter Location, City,State)___] : at [unfilled] [Verbal consent obtained from patient] : the patient, [unfilled] [Other:____] : [unfilled] [FreeTextEntry1] : She has been taking calcium plus vitamin D. She receives Prolia injections.  Last Prolia 8/25/21.  No new fractures.   She has fallen a few times.\par

## 2021-01-16 ENCOUNTER — RESULT REVIEW (OUTPATIENT)
Age: 66
End: 2021-01-16

## 2021-01-18 ENCOUNTER — FORM ENCOUNTER (OUTPATIENT)
Age: 66
End: 2021-01-18

## 2021-01-19 ENCOUNTER — OUTPATIENT (OUTPATIENT)
Dept: OUTPATIENT SERVICES | Facility: HOSPITAL | Age: 66
LOS: 1 days | End: 2021-01-19
Payer: MEDICARE

## 2021-01-19 DIAGNOSIS — I34.0 NONRHEUMATIC MITRAL (VALVE) INSUFFICIENCY: ICD-10-CM

## 2021-01-19 PROCEDURE — 93320 DOPPLER ECHO COMPLETE: CPT | Mod: 26

## 2021-01-19 PROCEDURE — 93312 ECHO TRANSESOPHAGEAL: CPT

## 2021-01-19 PROCEDURE — 93312 ECHO TRANSESOPHAGEAL: CPT | Mod: 26

## 2021-01-19 PROCEDURE — 76377 3D RENDER W/INTRP POSTPROCES: CPT | Mod: 26

## 2021-01-25 ENCOUNTER — APPOINTMENT (OUTPATIENT)
Dept: CARDIOTHORACIC SURGERY | Facility: CLINIC | Age: 66
End: 2021-01-25
Payer: MEDICARE

## 2021-01-25 PROCEDURE — 99213 OFFICE O/P EST LOW 20 MIN: CPT | Mod: 95

## 2021-02-08 ENCOUNTER — APPOINTMENT (OUTPATIENT)
Dept: CARDIOLOGY | Facility: CLINIC | Age: 66
End: 2021-02-08
Payer: MEDICARE

## 2021-02-08 VITALS
TEMPERATURE: 97 F | BODY MASS INDEX: 18.29 KG/M2 | HEART RATE: 83 BPM | WEIGHT: 100 LBS | SYSTOLIC BLOOD PRESSURE: 100 MMHG | OXYGEN SATURATION: 100 % | DIASTOLIC BLOOD PRESSURE: 80 MMHG

## 2021-02-08 PROCEDURE — 99215 OFFICE O/P EST HI 40 MIN: CPT

## 2021-02-09 RX ORDER — RIVASTIGMINE 13.3 MG/24H
13.3 PATCH, EXTENDED RELEASE TRANSDERMAL
Refills: 0 | Status: ACTIVE | COMMUNITY

## 2021-02-09 RX ORDER — TRAZODONE HYDROCHLORIDE 50 MG/1
50 TABLET ORAL
Qty: 30 | Refills: 0 | Status: ACTIVE | COMMUNITY
Start: 2021-01-29

## 2021-02-09 NOTE — HISTORY OF PRESENT ILLNESS
[FreeTextEntry1] : 66-year-old female\par Routine followup\par Dyspnea on exertion is unchanged from the recent past. No ankle edema. No orthopnea. No chest pain. No palpitations. No syncope.\par \par Debra is accompanied by her sister. His son is glistening and on the phone

## 2021-02-09 NOTE — PHYSICAL EXAM
[Auscultation Breath Sounds / Voice Sounds] : lungs were clear to auscultation bilaterally [Heart Sounds] : normal S1 and S2 [Bowel Sounds] : normal bowel sounds [Abdomen Soft] : soft [Cyanosis, Localized] : no localized cyanosis [] : no rash [Affect] : the affect was normal [FreeTextEntry1] : pleasant

## 2021-02-09 NOTE — DISCUSSION/SUMMARY
[FreeTextEntry1] : \par Valvular heart disease\par In 12/17 Debra underwent transcatheter mitral valve repair with mitral valve clip for severe mitral regurgitation . 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 Posterior mitral annular calcification was noted. The procedure was difficult due to calcification and commissural extension to P1 and P2 prolapse. The 10/20   transthoracic echocardiogram demonstrated  moderate -severe mitral regurgitation.. The mitral valve clip was in place and appeared stable. Residual posterior mitral valve leaflet prolapse was noted. The left atrium was 7.9 cm The left atrial volume was markedly increased at 198 mL/M2   Moderate pulmonary hypertension was noted. The pulmonary artery systolic pressure was 57 mmHg. The left ventricular ejection fraction was 57 %.   A 1/21 transesophageal echocardiogram demonstrated bileaflet prolapse. A mitral clip at A2/P2 creating a double orifice mitral valve was seen. Severe mitral regurgitation was noted. The largest mitral regurgitant jet was from the lateral orifice. An iatrogenic intra-atrial shunt was noted left to right. Biatrial enlargement was seen. Mild pulmonary hypertension was reflected by pulmonary artery systolic pressure 37 mmHg. The left ventricle ejection fraction was 55%  The present cardiac examination is consistent with this degree of mitral regurgitation and a euvolemic state New York Heart Association class II -III .\par \par Comment\par The patient's noncardiac medical problems including Parkinson's disease and  dementia are  major influences on management decisions.\par In view of the stable symptoms, preserved left ventricular systolic function and lack of pulmonary venous congestion continued medical management is most attractive at this time. \par \par I have recommended the following:\par 1.  continue the present medical regimen \par 2   Antibiotic prophylaxis for appropriate dental and surgical procedures\par 3. No further cardiac testing for this problem at this time\par 4  Transthoracic echocardiogram with next office evaluation in 6 months \par \par \par \par \par \par \par Atrial fibrillation\par The working diagnosis is chronic atrial fibrillation secondary to valvular ( mitral regurgitation) heart disease. Atrial fibrillation was first detected in 5/17 on a routine electrocardiogram. The last electrocardiogram demonstrating sinus rhythm was in 5/14. As such, the duration of atrial fibrillation is uncertain. The  10/20 echocardiogram demonstrated moderate -severe mitral insufficiency with severe left atrial dilatation 7.9 cm) and severe increased volume index (199 mL).   The 1/21 transesophageal echocardiogram also demonstrated severe mitral regurgitation and biatrial enlargement.  The duration of atrial fibrillation ,  degree of mitral disease coupled with the left atrial enlargement/increased volume would indicate a low likelihood of successful restoration and maintenance of sinus rhythm. Adequate rate control of the ventricular response appears to be achieved without the administration of AV osmin blocking agents . 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 A low "[id number]" score and concerns for the potential of trauma/falling make the risk of anticoagulation outweigh its potential benefit.\par \par I have recommended the following:\par 1.Continue present medical regimen\par 2 Consideration for  Holter monitor/event recorder  2021 to document adequate control of the ventricular response\par 3. Addition of beta blockers if required to control the ventricular response\par \par \par \par Hyperlipidemia\par Hyperlipidemia represents a risk factor for the development of atherosclerotic heart disease. However there is no evidence of such to date. A 10/05 stress sestamibi study was normal. The ejection fraction was 83%. A  2017 CT coronary angiogram prior to mitral valve clipping procedure for severe mitral regurgitation was normal. The target LDL level for primary prevention is about 100. The most recent lipid levels are not available for review. Nonpharmacologic therapy, specifically diet and exercise to the extent possible are emphasized as major aspects of treatment.\par \par I have recommended the following:\par 1 low-salt low-fat low-cholesterol diet. Regular aerobic exercise to the extent possible\par 2 laboratory studies including lipid profile through primary care Dr. PRIETO Aguero\par 3. Target LDL level to about 100 as discussed above\par \par \par The diagnosis, prognosis, risks, options and alternatives were explained at length to the patient and her family (son and sister). All questions were answered. Issues discussed included  dyspnea , atrial fibrillation, valvular heart disease  Parkinson's disease   noninvasive cardiac testing  and surgical/nonsurgical treatments for mitral regurgitation\par \par Counseling and/or coordination of care\par Time was a significant factor for this patient encounter. Total time spent with the patient was 40 minutes. 50% of the time was devoted to counseling and/or coordination of care.

## 2021-02-09 NOTE — REVIEW OF SYSTEMS
[Feeling Fatigued] : feeling fatigued [see HPI] : see HPI [Joint Pain] : no joint pain [Negative] : Integumentary

## 2021-02-17 ENCOUNTER — APPOINTMENT (OUTPATIENT)
Dept: RHEUMATOLOGY | Facility: CLINIC | Age: 66
End: 2021-02-17
Payer: MEDICARE

## 2021-02-17 PROCEDURE — 36415 COLL VENOUS BLD VENIPUNCTURE: CPT

## 2021-02-18 LAB
25(OH)D3 SERPL-MCNC: 38.4 NG/ML
ALBUMIN SERPL ELPH-MCNC: 5 G/DL
ALP BLD-CCNC: 86 U/L
ALT SERPL-CCNC: 11 U/L
ANION GAP SERPL CALC-SCNC: 12 MMOL/L
AST SERPL-CCNC: 23 U/L
BASOPHILS # BLD AUTO: 0.04 K/UL
BASOPHILS NFR BLD AUTO: 0.7 %
BILIRUB SERPL-MCNC: 0.7 MG/DL
BUN SERPL-MCNC: 26 MG/DL
CALCIUM SERPL-MCNC: 10.7 MG/DL
CALCIUM SERPL-MCNC: 10.7 MG/DL
CHLORIDE SERPL-SCNC: 100 MMOL/L
CO2 SERPL-SCNC: 27 MMOL/L
CREAT SERPL-MCNC: 0.8 MG/DL
EOSINOPHIL # BLD AUTO: 0.14 K/UL
EOSINOPHIL NFR BLD AUTO: 2.5 %
GLUCOSE SERPL-MCNC: 86 MG/DL
HCT VFR BLD CALC: 46.5 %
HGB BLD-MCNC: 14.4 G/DL
IMM GRANULOCYTES NFR BLD AUTO: 0.2 %
LYMPHOCYTES # BLD AUTO: 1.67 K/UL
LYMPHOCYTES NFR BLD AUTO: 30.1 %
MAN DIFF?: NORMAL
MCHC RBC-ENTMCNC: 28.6 PG
MCHC RBC-ENTMCNC: 31 GM/DL
MCV RBC AUTO: 92.4 FL
MONOCYTES # BLD AUTO: 0.36 K/UL
MONOCYTES NFR BLD AUTO: 6.5 %
NEUTROPHILS # BLD AUTO: 3.32 K/UL
NEUTROPHILS NFR BLD AUTO: 60 %
PARATHYROID HORMONE INTACT: 52 PG/ML
PLATELET # BLD AUTO: 180 K/UL
POTASSIUM SERPL-SCNC: 4.7 MMOL/L
PROT SERPL-MCNC: 7.9 G/DL
RBC # BLD: 5.03 M/UL
RBC # FLD: 18.5 %
SODIUM SERPL-SCNC: 139 MMOL/L
URATE SERPL-MCNC: 3.8 MG/DL
WBC # FLD AUTO: 5.54 K/UL

## 2021-02-19 LAB — OSTEOCALCIN SERPL-MCNC: 8.4 NG/ML

## 2021-02-22 LAB — COLLAGEN CTX SERPL-MCNC: 299 PG/ML

## 2021-02-25 ENCOUNTER — APPOINTMENT (OUTPATIENT)
Dept: RHEUMATOLOGY | Facility: CLINIC | Age: 66
End: 2021-02-25

## 2021-07-09 ENCOUNTER — APPOINTMENT (OUTPATIENT)
Dept: CARDIOLOGY | Facility: CLINIC | Age: 66
End: 2021-07-09
Payer: MEDICARE

## 2021-07-09 PROCEDURE — 99441: CPT | Mod: 95

## 2021-08-03 ENCOUNTER — APPOINTMENT (OUTPATIENT)
Dept: CARDIOLOGY | Facility: CLINIC | Age: 66
End: 2021-08-03
Payer: MEDICARE

## 2021-08-03 PROCEDURE — 93306 TTE W/DOPPLER COMPLETE: CPT

## 2021-08-09 ENCOUNTER — APPOINTMENT (OUTPATIENT)
Dept: CARDIOLOGY | Facility: CLINIC | Age: 66
End: 2021-08-09
Payer: MEDICARE

## 2021-08-09 VITALS
HEIGHT: 62 IN | TEMPERATURE: 98.3 F | BODY MASS INDEX: 16.82 KG/M2 | DIASTOLIC BLOOD PRESSURE: 69 MMHG | OXYGEN SATURATION: 99 % | SYSTOLIC BLOOD PRESSURE: 123 MMHG | HEART RATE: 89 BPM | WEIGHT: 91.38 LBS

## 2021-08-09 DIAGNOSIS — Z87.81 PERSONAL HISTORY OF (HEALED) TRAUMATIC FRACTURE: ICD-10-CM

## 2021-08-09 PROCEDURE — 99214 OFFICE O/P EST MOD 30 MIN: CPT

## 2021-08-09 NOTE — HISTORY OF PRESENT ILLNESS
[FreeTextEntry1] : 66-year-old female\par Routine followup\par Discharge exertion is unchanged from in the past. No chest pain. No ankle edema. No orthopnea. No syncope.\par \par Debra  is accompanied today by her sister. Her son is available on speaker phone

## 2021-08-09 NOTE — REVIEW OF SYSTEMS
[Feeling Fatigued] : feeling fatigued [Dyspnea on exertion] : dyspnea during exertion [Joint Pain] : joint pain [Negative] : Heme/Lymph [FreeTextEntry3] : glasses [FreeTextEntry5] : see history of present illness

## 2021-08-24 ENCOUNTER — APPOINTMENT (OUTPATIENT)
Dept: CARDIOTHORACIC SURGERY | Facility: CLINIC | Age: 66
End: 2021-08-24
Payer: MEDICARE

## 2021-08-24 PROCEDURE — 99203 OFFICE O/P NEW LOW 30 MIN: CPT | Mod: 95

## 2021-08-25 NOTE — REASON FOR VISIT
[Consultation] : a consultation visit [Home] : at home, [unfilled] , at the time of the visit. [Medical Office: ([facility])___] : at the medical office located in  [Family Member] : family member [Spouse] : spouse [Verbal consent obtained from patient] : the patient, [unfilled]

## 2021-08-27 NOTE — END OF VISIT
[Time Spent: ___ minutes] : I have spent [unfilled] minutes of time on the encounter. [FreeTextEntry3] : I, JERAMY LARSEN , am scribing for and in the presence of SUKHDEEP DORADO the following sections: History of present illness, past Medical/family/surgical/family/social history, review of systems, vital signs, physical exam and disposition.\par I personally performed the services described in the documentation, reviewed the documentation recorded by the scribe in my presence and it accurately and completely records my words and actions.\par

## 2021-08-27 NOTE — ASSESSMENT
[FreeTextEntry1] : 65 yo female presents with PMH of AF(not on AC s/t fall risk), Gaucher disease, former smoker, Parkinsons Disease s/p deep brain stimulator, depression, dementia, osteoporosis, scoliosis, acute on chronic diastolic heart failure and mitral valve regurgitation s/p mitral clip on 12/17/17 @ [facility] with residual  MR. Dr. Diana reviewed the echocardiogram images and reports with patient and her family and discussed the case with Dr. Galvan. Dr. Diana deems her high risk for surgery s/t severe parkinson's disease and co morbidities. All questions addressed.  The patient and family do not want to proceed with surgery. She will continue medical management and follow up with Dr. Galvan.

## 2021-08-27 NOTE — DATA REVIEWED
[FreeTextEntry1] : 8/3/21 Echo: \par 1. normal LV size and systolic function, LEF 56%\par 2. severely increased LA volume index\par 3. severely dilated right atrium\par 4. left to right interatrial shunt is noted and likely due to prior mitral valve clip procedure\par 5. mild AR\par 6. prolapse of posterior mitral leaflet with moderate to severe anteriorly directed MR. MV clip is noted.\par 7. mild TR\par \par 1/19/21 SUSANA: \par  1. Normal left and right ventricular size and systolic function.\par  2. Bileaflet prolapse with billowing of the posterior leaflet. There is a Mitraclip at A2/P2 creating a double orifice mitral valve. Severe mitral regurgitation seen at a blood pressure of 90/55 mmHg. Largest mitral regurgitation jet origin appears to be from the lateral orifice.\par  3. There is an iatrogenic interatrial shunt noted which is predominantly left-to-right.\par  4. Biatrial enlargement.\par  5. No LA/RA/BOBY/RAA thrombus seen.\par  6. Mild tricuspid regurgitation.\par  7. Pulmonary artery systolic pressure is 37 mmHg.\par  8. No pericardial effusion.\par  9. No prior echo is available for comparison.

## 2021-08-27 NOTE — HISTORY OF PRESENT ILLNESS
[FreeTextEntry1] : 65 yo female presents with PMH of AF(not on AC s/t fall risk), Gaucher disease, former smoker, Parkinsons Disease s/p deep brain stimulator, depression, dementia, osteoporosis, scoliosis, acute on chronic diastolic heart failure and mitral valve regurgitation s/p mitral clip on 12/17/17 @ [facility] with residual  MR. \par \par Patient presents via tele health for surgical consult with Dr. Diana. She appears extremely frail and report PHAN when walking 1 block. Symptoms are unchanged for the past 12 months. She is able to perform ADLs independently. \par \par

## 2021-10-26 ENCOUNTER — APPOINTMENT (OUTPATIENT)
Dept: GASTROENTEROLOGY | Facility: CLINIC | Age: 66
End: 2021-10-26
Payer: MEDICARE

## 2021-10-26 VITALS
HEART RATE: 78 BPM | OXYGEN SATURATION: 97 % | WEIGHT: 91 LBS | TEMPERATURE: 99.1 F | HEIGHT: 62 IN | SYSTOLIC BLOOD PRESSURE: 100 MMHG | BODY MASS INDEX: 16.75 KG/M2 | DIASTOLIC BLOOD PRESSURE: 70 MMHG

## 2021-10-26 DIAGNOSIS — Z12.11 ENCOUNTER FOR SCREENING FOR MALIGNANT NEOPLASM OF COLON: ICD-10-CM

## 2021-10-26 PROCEDURE — 99204 OFFICE O/P NEW MOD 45 MIN: CPT

## 2021-10-27 PROBLEM — Z12.11 COLON CANCER SCREENING: Status: ACTIVE | Noted: 2021-10-27

## 2021-10-27 NOTE — HISTORY OF PRESENT ILLNESS
[FreeTextEntry1] : 66 year old F with HLD, chronic afib not on AC, h/o anxiety/.depression, Parkinson's dementia/deep brain stimulation, , Gaucher disease, hyperparathyroidism, osteoporosis, OA, h/o IBS, hospitalized 01/2020 for pna, s/p mitral valve clipping procedure,  severe MR, mild pulm htn, NYHA Class II- III,  scoliosis, fam hx CRC - father- dx age 60 presents for evaluation of colon cancer screening. \par She is seen at the request of Dr. Candace Aguero. \par \par \par two prior colonoscopies- \par 1st one had inadequate preparation\par 2nd one with Dr. Shelton- 05/2017- for fam hx CRC, h/o polyps, \par procedure report not available. \par path report showed hepatic flexure tubular adenoma, snare, gross description- 0.3 cm in greatest dimension\par she does not know recall. \par \par she continues to suffer from chronic constipation and abdominal distension which she reports has been chronic for her whole life .She has bm QOD. no brbpr/melena. \par  \par PHAN when walks 1 block, She lives at home with 24 hour aid\par hemorrhoids since childbirth- no bleeding/ itching/pain, \par \par Patient denies, n/v/heartburn, reflux, dysphagia/odynophagia, change in bowel habits, diarrhea,  brbpr, melena. no weight loss.  Good appetite and energy level. Patient has daily BM, denies regular NSAID use. \par \par labs 02/2021- normal cbc , cmet\par

## 2021-10-27 NOTE — ASSESSMENT
[FreeTextEntry1] : Screening colonoscopy, prior records not available, however would recommend a 5 year recall based on available records with repeat colonoscopy in 05/2022\par \par Risks (including but not limited to sedation risks, infection, bleeding, perforation, possibility of missed lesions), benefits and alternatives to procedure, including not doing the procedure, were discussed with patient. Patient understood and agreed to proceed with colonoscopy. \par Colonoscopy preparation instructions reviewed with patient.\par \par Explained to patient that cardiac clearance would be required prior to procedure. \par Patient requested I speak with her son who was not available during today's visit. Will try to contact him to discuss procedure. \par

## 2021-10-27 NOTE — PHYSICAL EXAM
[General Appearance - Alert] : alert [General Appearance - In No Acute Distress] : in no acute distress [Sclera] : the sclera and conjunctiva were normal [Outer Ear] : the ears and nose were normal in appearance [Neck Appearance] : the appearance of the neck was normal [] : no respiratory distress [Abdomen Soft] : soft [Abdomen Tenderness] : non-tender [Abnormal Walk] : normal gait [Skin Color & Pigmentation] : normal skin color and pigmentation [Oriented To Time, Place, And Person] : oriented to person, place, and time [FreeTextEntry1] : deferred

## 2021-11-24 ENCOUNTER — APPOINTMENT (OUTPATIENT)
Dept: CARDIOTHORACIC SURGERY | Facility: CLINIC | Age: 66
End: 2021-11-24
Payer: MEDICARE

## 2021-11-24 PROCEDURE — 99214 OFFICE O/P EST MOD 30 MIN: CPT | Mod: 95

## 2021-12-13 ENCOUNTER — APPOINTMENT (OUTPATIENT)
Dept: CARDIOLOGY | Facility: CLINIC | Age: 66
End: 2021-12-13
Payer: MEDICARE

## 2021-12-13 PROCEDURE — 99443: CPT | Mod: 95

## 2021-12-17 ENCOUNTER — APPOINTMENT (OUTPATIENT)
Dept: CARDIOLOGY | Facility: CLINIC | Age: 66
End: 2021-12-17
Payer: MEDICARE

## 2021-12-17 PROCEDURE — 99443: CPT | Mod: 95

## 2022-02-01 ENCOUNTER — APPOINTMENT (OUTPATIENT)
Dept: CARDIOLOGY | Facility: CLINIC | Age: 67
End: 2022-02-01
Payer: MEDICARE

## 2022-02-01 VITALS
BODY MASS INDEX: 17.3 KG/M2 | DIASTOLIC BLOOD PRESSURE: 58 MMHG | HEIGHT: 62 IN | WEIGHT: 94 LBS | OXYGEN SATURATION: 99 % | SYSTOLIC BLOOD PRESSURE: 107 MMHG | HEART RATE: 82 BPM

## 2022-02-01 VITALS
SYSTOLIC BLOOD PRESSURE: 107 MMHG | HEART RATE: 82 BPM | TEMPERATURE: 98.6 F | BODY MASS INDEX: 17.3 KG/M2 | HEIGHT: 62 IN | WEIGHT: 94 LBS | DIASTOLIC BLOOD PRESSURE: 58 MMHG | OXYGEN SATURATION: 99 %

## 2022-02-01 PROCEDURE — 99215 OFFICE O/P EST HI 40 MIN: CPT

## 2022-02-02 NOTE — DISCUSSION/SUMMARY
[FreeTextEntry1] : \par Valvular heart disease\par In 12/17 Debra underwent transcatheter mitral valve repair with mitral valve clip for severe mitral regurgitation . 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 Posterior mitral annular calcification was noted. The procedure was difficult due to calcification and commissural extension to P1 and P2 prolapse. \par   A 1/21 transesophageal echocardiogram demonstrated bileaflet prolapse. A mitral clip at A2/P2 creating a double orifice mitral valve was seen. Severe mitral regurgitation was noted. The largest mitral regurgitant jet was from the lateral orifice. An iatrogenic intra-atrial shunt was noted left to right. Biatrial enlargement was seen. Mild pulmonary hypertension was reflected by pulmonary artery systolic pressure 37 mmHg. The left ventricle ejection fraction was 55% .\par The 8/21 transthoracic echocardiogram demonstrated prolapse of the posterior mitral valve leaflet with moderate to severe mitral regurgitation. The mitral valve clip was noted. The left atrial volume index was severely increased at 300 mL/M2 with marked left atrial enlargement at 6.1 cm. The pulmonary systolic pressure was normal at 30 mmHg. The left ventricular  ejection fraction was 56%\par  The present cardiac examination is consistent with this degree of mitral regurgitation and a euvolemic state New York Heart Association class II -III . A second mitral valve clipping procedure would be technically difficult and probably not provide hemodynamic benefit..\par \par Comment\par The patient's noncardiac medical problems including Parkinson's disease and  dementia are  major influences on management decisions.  Diuretics have not been tolerated and have affected best  Debra's quality of life.  Dr. Vargas felt that the patient might be a candidate for a mitral NITZA removal and compassionate use of the Tendyne valve\par . \par \par I have recommended the following:\par 1.  discontinue torsemide  \par 2   Antibiotic prophylaxis for appropriate dental and surgical procedures\par 3   Followup with Dr. Vargas\par 4.   Await "second opinion" Dr. PRIETO OBANDO\par 5  Transthoracic echocardiogram with next office evaluation in 6/22\par \par \par \par \par \par \par Atrial fibrillation\par The working diagnosis is chronic atrial fibrillation secondary to valvular ( mitral regurgitation) heart disease. Atrial fibrillation was first detected in 5/17 on a routine electrocardiogram. The last electrocardiogram demonstrating sinus rhythm was in 5/14. As such, the duration of atrial fibrillation is uncertain. The  10/20 echocardiogram demonstrated moderate -severe mitral insufficiency with severe left atrial dilatation 7.9 cm) and severe increased volume index (199 mL).   The 1/21 transesophageal echocardiogram also demonstrated severe mitral regurgitation and biatrial enlargement.  The duration of atrial fibrillation ,  degree of mitral disease coupled with the left atrial enlargement/increased volume would indicate a low likelihood of successful restoration and maintenance of sinus rhythm. Adequate rate control of the ventricular response appears to be achieved without the administration of AV osmin blocking agents . 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 A low "[id number]" score and concerns for the potential of trauma/falling make the risk of anticoagulation outweigh its potential benefit.\par \par I have recommended the following:\par 1.Continue present medical regimen\par 2 Consideration for  Holter monitor/event recorder  2021 to document adequate control of the ventricular response\par 3. Addition of beta blockers if required to control the ventricular response\par \par \par \par Hyperlipidemia\par Hyperlipidemia represents a risk factor for the development of atherosclerotic heart disease. However there is no evidence of such to date. A 10/05 stress sestamibi study was normal. The ejection fraction was 83%. A  2017 CT coronary angiogram prior to mitral valve clipping procedure for severe mitral regurgitation was normal. The target LDL level for primary prevention is about 100. The most recent lipid levels are not available for review. Nonpharmacologic therapy, specifically diet and exercise to the extent possible are emphasized as major aspects of treatment.\par \par I have recommended the following:\par 1 low-salt low-fat low-cholesterol diet. Regular aerobic exercise to the extent possible\par 2 laboratory studies including lipid profile through primary care Dr. PRIETO Aguero\par 3. Target LDL level to about 100 as discussed above\par \par \par The diagnosis, prognosis, risks, options and alternatives were explained at length to the patient and her family (son and sister). All questions were answered. Issues discussed included  dyspnea , atrial fibrillation, valvular heart disease  Parkinson's disease   noninvasive cardiac testing  and surgical/nonsurgical treatments for mitral regurgitation\par \par Counseling and/or coordination of care\par Time was a significant factor for this patient encounter. Total time spent with the patient was  40   minutes. 50% of the time was devoted to counseling and/or coordination of care.

## 2022-02-02 NOTE — HISTORY OF PRESENT ILLNESS
[FreeTextEntry1] : 66-year-old female\par Routine followup\par Debra complains of increasing dyspnea on exertion and most bitterly of urinary frequency urgency and incontinence which she attributes the administration of Lasix.\par \par Debra's son participated in this visit by telephone\par The patient and her family are planning to seek a second opinion regarding management through Dr. PRIETO Ledezma [facility]

## 2022-02-02 NOTE — REVIEW OF SYSTEMS
[Feeling Fatigued] : feeling fatigued [Hearing Loss] : hearing loss [SOB] : shortness of breath [Dyspnea on exertion] : dyspnea during exertion [Joint Pain] : joint pain [Confusion] : confusion was observed [Depression] : depression [Anxiety] : anxiety [Negative] : Heme/Lymph [FreeTextEntry5] : see history of present illness [FreeTextEntry8] : urinary frequency/incontinence/urgency

## 2022-03-21 ENCOUNTER — TRANSCRIPTION ENCOUNTER (OUTPATIENT)
Age: 67
End: 2022-03-21

## 2022-05-10 ENCOUNTER — TRANSCRIPTION ENCOUNTER (OUTPATIENT)
Age: 67
End: 2022-05-10

## 2022-06-14 ENCOUNTER — TRANSCRIPTION ENCOUNTER (OUTPATIENT)
Age: 67
End: 2022-06-14

## 2022-08-15 ENCOUNTER — RESULT REVIEW (OUTPATIENT)
Age: 67
End: 2022-08-15

## 2022-08-16 ENCOUNTER — APPOINTMENT (OUTPATIENT)
Dept: CARDIOLOGY | Facility: CLINIC | Age: 67
End: 2022-08-16

## 2022-08-16 VITALS
SYSTOLIC BLOOD PRESSURE: 100 MMHG | BODY MASS INDEX: 17.38 KG/M2 | HEART RATE: 117 BPM | DIASTOLIC BLOOD PRESSURE: 90 MMHG | WEIGHT: 95 LBS | OXYGEN SATURATION: 92 %

## 2022-08-16 DIAGNOSIS — Z86.79 PERSONAL HISTORY OF OTHER DISEASES OF THE CIRCULATORY SYSTEM: ICD-10-CM

## 2022-08-16 DIAGNOSIS — Z86.59 PERSONAL HISTORY OF OTHER MENTAL AND BEHAVIORAL DISORDERS: ICD-10-CM

## 2022-08-16 PROCEDURE — 99215 OFFICE O/P EST HI 40 MIN: CPT

## 2022-08-21 PROBLEM — Z86.79 HISTORY OF CHRONIC ATRIAL FIBRILLATION: Status: RESOLVED | Noted: 2018-05-14 | Resolved: 2022-08-21

## 2022-08-21 PROBLEM — Z86.59 HISTORY OF DEPRESSION: Status: RESOLVED | Noted: 2018-05-14 | Resolved: 2022-08-21

## 2022-08-21 RX ORDER — CARBIDOPA AND LEVODOPA 25; 100 MG/1; MG/1
25-100 TABLET, EXTENDED RELEASE ORAL
Refills: 0 | Status: ACTIVE | COMMUNITY
Start: 2020-10-20

## 2022-08-21 NOTE — HISTORY OF PRESENT ILLNESS
[FreeTextEntry1] : 67-year-old female\par Unscheduled visit\par Patient called complaining of dyspnea and left leg pain.  She was evaluated this morning at the [place] emergency room.   At the present time complains only of left leg pain and generalized weakness. No chest pain. No shortness of breath at rest. No orthopnea. No paroxysmal nocturnal dyspnea\par

## 2022-08-21 NOTE — REVIEW OF SYSTEMS
[Feeling Fatigued] : feeling fatigued [Joint Pain] : joint pain [Depression] : depression [Anxiety] : anxiety [Negative] : Heme/Lymph [FreeTextEntry5] : see history of present illness [de-identified] : severe parkinsons

## 2022-08-21 NOTE — DISCUSSION/SUMMARY
[FreeTextEntry1] : Shortness of breath\par The working diagnosis dyspnea secondary to increased energy requirements related to Parkinson's disease with decreased mobility. The history is consistent with this diagnosis. The differential diagnosis would include congestive heart failure secondary to valvular heart disease (mitral regurgitation)  The 8/16 portable chest x-ray was read as mild pulmonary vascular congestion. However the   physical findings with clear lung fields absent x-ray  neck vein distention and lack of peripheral edema  argue against acute pulmonary venous congestion. The 8/15/22 chest x-ray with PA and lateral projections failed to demonstrate evidence of heart failure. To my review the 8/16/22 portable chest x-ray fails to demonstrate any significant pulmonary venous congestion. The difference in readings is in large part related to technical limitations of the portable chest x-ray.\par \par I have recommended the following\par a. Continue the present medical regimen\par b. Await structural heart disease evaluation through Dr. PRIETO OBANDO\par \par \par \par \par \par Valvular heart disease\par In 12/17 Debra underwent transcatheter mitral valve repair with mitral valve clip for severe mitral regurgitation . 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 Posterior mitral annular calcification was noted. The procedure was difficult due to calcification and commissural extension to P1 and P2 prolapse. \par   A 1/21 transesophageal echocardiogram demonstrated bileaflet prolapse. A mitral clip at A2/P2 creating a double orifice mitral valve was seen. Severe mitral regurgitation was noted. The largest mitral regurgitant jet was from the lateral orifice. An iatrogenic intra-atrial shunt was noted left to right. Biatrial enlargement was seen. Mild pulmonary hypertension was reflected by pulmonary artery systolic pressure 37 mmHg. The left ventricle ejection fraction was 55% .\par The 8/21 transthoracic echocardiogram demonstrated prolapse of the posterior mitral valve leaflet with moderate to severe mitral regurgitation. The mitral valve clip was noted. The left atrial volume index was severely increased at 300 mL/M2 with marked left atrial enlargement at 6.1 cm. The pulmonary systolic pressure was normal at 30 mmHg. The left ventricular  ejection fraction was 56%\par  The present cardiac examination is consistent with this degree of mitral regurgitation and a euvolemic state New [place] Heart Association class II -III . A second mitral valve clipping procedure is thought to be  technically difficult and probably not provide hemodynamic benefit..\par \par Comment\par The patient's noncardiac medical problems including Parkinson's disease and  dementia are  major influences on management decisions.  Diuretics have not been tolerated and have affected best  Debra's quality of life.  Dr. Vargas felt that the patient might be a candidate for a mitral NITZA removal and compassionate use of the Tendyne valve\par Most recently Debra has been evaluated by Dr. PRIETO OBANDO. The medical records echocardiographic reports etc. are not available for review\par . \par \par I have recommended the following:\par 1.  Antibiotic prophylaxis for appropriate dental and surgical procedures\par 2   Await "second opinion" Dr. PRIETO OBANDO\par 3  Transthoracic echocardiogram through Dr. Ledezma\par \par \par \par \par Atrial fibrillation\par The working diagnosis is chronic atrial fibrillation secondary to valvular ( mitral regurgitation) heart disease. Atrial fibrillation was first detected in 5/17 on a routine electrocardiogram. The last electrocardiogram demonstrating sinus rhythm was in 5/14. As such, the duration of atrial fibrillation is uncertain. The  10/20 echocardiogram demonstrated moderate -severe mitral insufficiency with severe left atrial dilatation 7.9 cm) and severe increased volume index (199 mL).   The 1/21 transesophageal echocardiogram also demonstrated severe mitral regurgitation and biatrial enlargement.  The duration of atrial fibrillation ,  degree of mitral disease coupled with the left atrial enlargement/increased volume would indicate a low likelihood of successful restoration and maintenance of sinus rhythm. Adequate rate control of the ventricular response appears to be achieved without the administration of AV osmin blocking agents . 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 A low "[id number]" score and concerns for the potential of trauma/falling make the risk of anticoagulation outweigh its potential benefit.\par \par I have recommended the following:\par 1.Continue present medical regimen\par 2 Consideration for  Zio patch  monitor/event recorder  2023  to document adequate control of the ventricular response\par 3. Addition of beta blockers if required to control the ventricular response\par \par \par \par Hyperlipidemia\par Hyperlipidemia represents a risk factor for the development of atherosclerotic heart disease. However there is no evidence of such to date. A 10/05 stress sestamibi study was normal. The ejection fraction was 83%. A  2017 CT coronary angiogram prior to mitral valve clipping procedure for severe mitral regurgitation was normal. The target LDL level for primary prevention is about 100. The most recent lipid levels are not available for review. Nonpharmacologic therapy, specifically diet and exercise to the extent possible are emphasized as major aspects of treatment.\par \par I have recommended the following:\par 1 low-salt low-fat low-cholesterol diet. Regular aerobic exercise to the extent possible\par 2 laboratory studies including lipid profile through primary care Dr. PRIETO Aguero\par 3. Target LDL level to about 100 as discussed above\par \par \par The diagnosis, prognosis, risks, options and alternatives were explained at length to the patient and her family (son ). All questions were answered. Issues discussed included  dyspnea , atrial fibrillation, valvular heart disease  Parkinson's disease   noninvasive cardiac testing  and surgical/nonsurgical treatments for mitral regurgitation\par \par Counseling and/or coordination of care\par Time was a significant factor for this patient encounter. Total time spent with the patient was  40   minutes. 50% of the time was devoted to counseling and/or coordination of care.

## 2022-10-06 ENCOUNTER — NON-APPOINTMENT (OUTPATIENT)
Age: 67
End: 2022-10-06

## 2022-10-31 ENCOUNTER — APPOINTMENT (OUTPATIENT)
Dept: CARDIOLOGY | Facility: CLINIC | Age: 67
End: 2022-10-31

## 2022-10-31 VITALS
HEART RATE: 66 BPM | DIASTOLIC BLOOD PRESSURE: 55 MMHG | HEIGHT: 62 IN | WEIGHT: 95 LBS | BODY MASS INDEX: 17.48 KG/M2 | OXYGEN SATURATION: 100 % | SYSTOLIC BLOOD PRESSURE: 86 MMHG

## 2022-10-31 PROCEDURE — 99215 OFFICE O/P EST HI 40 MIN: CPT

## 2022-11-01 RX ORDER — SELEGILINE HYDROCHLORIDE 5 MG/1
5 CAPSULE ORAL
Refills: 0 | Status: ACTIVE | COMMUNITY

## 2022-11-01 RX ORDER — SERTRALINE 25 MG/1
TABLET, FILM COATED ORAL
Refills: 0 | Status: ACTIVE | COMMUNITY

## 2022-11-01 NOTE — DISCUSSION/SUMMARY
[FreeTextEntry1] : Valvular heart disease\par In 12/17 Debra underwent transcatheter mitral valve repair with mitral valve clip for severe mitral regurgitation . 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 Posterior mitral annular calcification was noted. The procedure was difficult due to calcification and commissural extension to P1 and P2 prolapse. \par \par In 10/22  Debra  underwent a second mitral valve clipping procedure by Dr. PRIETO Ledezma [facility]. The post procedure transesophageal echocardiogram revealed moderate mitral regurgitation . 2 small iatrogenic atrial septal defects with left to right shunting were seen. The mean gradient across the atrial septal defect decreased from 11 mmHg to 4 mmHg supporting significant decrease in left atrial pressure following the new mitral valve clip. The left ventricular ejection fraction was 60%.\par   \par  The present cardiac examination is consistent with this degree of mitral regurgitation and a euvolemic state New York Heart Association class II -III . \par \par . \par \par I have recommended the following:\par 1.  Antibiotic prophylaxis for appropriate dental and surgical procedures\par 2   Continue present medical regimen\par 3  Transthoracic echocardiogram  with next office evaluation\par \par \par \par \par Atrial fibrillation\par The working diagnosis is chronic atrial fibrillation secondary to valvular ( mitral regurgitation) heart disease. Atrial fibrillation was first detected in 5/17 on a routine electrocardiogram. The last electrocardiogram demonstrating sinus rhythm was in 5/14. As such, the duration of atrial fibrillation is uncertain. The  10/20 echocardiogram demonstrated moderate -severe mitral insufficiency with severe left atrial dilatation 7.9 cm) and severe increased volume index (199 mL).   The 1/21 transesophageal echocardiogram also demonstrated severe mitral regurgitation and biatrial enlargement.  The duration of atrial fibrillation ,  degree of mitral disease coupled with the left atrial enlargement/increased volume would indicate a low likelihood of successful restoration and maintenance of sinus rhythm. Adequate rate control of the ventricular response appears to be achieved without the administration of AV osmin blocking agents . 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 A low "[id number]" score and concerns for the potential of trauma/falling make the risk of anticoagulation outweigh its potential benefit.\par \par I have recommended the following:\par 1.Continue present medical regimen\par 2 Consideration for  Zio patch  monitor/event recorder  2023  to document adequate control of the ventricular response\par 3. Addition of beta blockers if required to control the ventricular response\par \par \par \par Hyperlipidemia\par Hyperlipidemia represents a risk factor for the development of atherosclerotic heart disease. However there is no evidence of such to date. A 10/05 stress sestamibi study was normal. The ejection fraction was 83%. A  2017 CT coronary angiogram prior to mitral valve clipping procedure for severe mitral regurgitation was normal. The target LDL level for primary prevention is about 100. The most recent lipid levels are not available for review. Nonpharmacologic therapy, specifically diet and exercise to the extent possible are emphasized as major aspects of treatment.\par \par I have recommended the following:\par 1 low-salt low-fat low-cholesterol diet. Regular aerobic exercise to the extent possible\par 2 laboratory studies including lipid profile through primary care Dr. PRIETO Aguero\par 3. Target LDL level to about 100 as discussed above\par \par \par Shortness of breath\par The working diagnosis dyspnea secondary to increased energy requirements related to Parkinson's disease with decreased mobility and mitral regurgitation  The history is consistent with this diagnosis. The family  raise concern for chronic lung disease contributing to the patient's dyspnea\par \par I have recommended the following\par a. Continue the present medical regimen\par b. pulmonary consultation  Dr. Sanderson\par \par The diagnosis, prognosis, risks, options and alternatives were explained at length to the patient and her family (son ). All questions were answered. Issues discussed included  dyspnea , atrial fibrillation, valvular heart disease  Parkinson's disease   noninvasive cardiac testing  and surgical/nonsurgical treatments for mitral regurgitation\par \par Counseling and/or coordination of care\par Time was a significant factor for this patient encounter. Total time spent with the patient was  40   minutes. 50% of the time was devoted to counseling and/or coordination of care.

## 2022-11-15 ENCOUNTER — APPOINTMENT (OUTPATIENT)
Dept: CARDIOLOGY | Facility: CLINIC | Age: 67
End: 2022-11-15

## 2023-01-09 ENCOUNTER — NON-APPOINTMENT (OUTPATIENT)
Age: 68
End: 2023-01-09

## 2023-01-23 ENCOUNTER — APPOINTMENT (OUTPATIENT)
Dept: CARDIOLOGY | Facility: CLINIC | Age: 68
End: 2023-01-23
Payer: MEDICARE

## 2023-01-23 PROCEDURE — 93306 TTE W/DOPPLER COMPLETE: CPT

## 2023-01-30 ENCOUNTER — APPOINTMENT (OUTPATIENT)
Dept: CARDIOLOGY | Facility: CLINIC | Age: 68
End: 2023-01-30
Payer: MEDICARE

## 2023-01-30 VITALS — BODY MASS INDEX: 16.2 KG/M2 | WEIGHT: 88 LBS | HEIGHT: 62 IN | OXYGEN SATURATION: 99 % | HEART RATE: 59 BPM

## 2023-01-30 DIAGNOSIS — R06.02 SHORTNESS OF BREATH: ICD-10-CM

## 2023-01-30 PROCEDURE — 99215 OFFICE O/P EST HI 40 MIN: CPT

## 2023-01-31 PROBLEM — R06.02 SHORTNESS OF BREATH: Status: ACTIVE | Noted: 2020-09-23

## 2023-01-31 NOTE — REVIEW OF SYSTEMS
[Feeling Fatigued] : feeling fatigued [Hearing Loss] : hearing loss [Joint Pain] : joint pain [Negative] : Psychiatric [FreeTextEntry5] : See history of present illness [de-identified] : Parkinsonian features

## 2023-01-31 NOTE — HISTORY OF PRESENT ILLNESS
[FreeTextEntry1] : 67-year-old female\par Routine follow-up\steph Richardson complains of being "tired" no angina.  Dyspnea on exertion is unchanged from the recent past..  No ankle edema.  No syncope.  She has not been taking Lasix. \par \steph Richardson is accompanied today by her aide and her son is in contact by telephone for this visit

## 2023-01-31 NOTE — DISCUSSION/SUMMARY
[FreeTextEntry1] : Valvular heart disease\par In 12/17 Debra underwent transcatheter mitral valve repair with mitral valve clip for severe mitral regurgitation . 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 Posterior mitral annular calcification was noted. The procedure was difficult due to calcification and commissural extension to P1 and P2 prolapse. \par \par In 10/22  Debra  underwent a second mitral valve clipping procedure by Dr. PRIETO Ledezma [facility]. The post procedure transesophageal echocardiogram revealed moderate mitral regurgitation . 2 small iatrogenic atrial septal defects with left to right shunting were seen. The mean gradient across the atrial septal defect decreased from 11 mmHg to 4 mmHg supporting significant decrease in left atrial pressure following the new mitral valve clip. The left ventricular ejection fraction was 60%.\par \par A 1/23 echocardiogram revealed a mitral valve clip with moderate mitral regurgitation.  A left to right intra-atrial shunt due to the prior  transseptal puncture and moderate pulmonary hypertension reflected by pulmonary artery systolic pressure 57 mmHg. was noted..  Severe right/left atrial enlargement was seen.  The left ventricular ejection fraction was 64%\par   \par  The present cardiac examination is consistent with this degree of mitral regurgitation and a euvolemic state New York Heart Association class II -III . \par  Debra has declined diuretic therapy and as such careful monitoring for fluid retention is required.\par \par . \par \par I have recommended the following:\par 1.  Antibiotic prophylaxis for appropriate dental and surgical procedures\par 2   Continue present medical regimen\par 3   no further cardiac testing for this problem at this time\par 4.  Monitoring for fluid retention i.e. weights, assessing for peripheral edema etc. as discussed above\par \par \par \par Atrial fibrillation\par The working diagnosis is chronic atrial fibrillation secondary to valvular ( mitral regurgitation) heart disease. Atrial fibrillation was first detected in 5/17 on a routine electrocardiogram. The last electrocardiogram demonstrating sinus rhythm was in 5/14. As such, the duration of atrial fibrillation is uncertain. The  10/20 echocardiogram demonstrated moderate -severe mitral insufficiency with severe left atrial dilatation 7.9 cm) and severe increased volume index (199 mL).   The 1/21 transesophageal echocardiogram also demonstrated severe mitral regurgitation and biatrial enlargement.  The duration of atrial fibrillation ,  degree of mitral disease coupled with the left atrial enlargement/increased volume would indicate a low likelihood of successful restoration and maintenance of sinus rhythm. Adequate rate control of the ventricular response appears to be achieved without the administration of AV osmin blocking agents . 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 A low "[id number]" score and concerns for the potential of trauma/falling make the risk of anticoagulation outweigh its potential benefit.\par \par I have recommended the following:\par 1.Continue present medical regimen\par 2 Consideration for  Zio patch  monitor/event recorder  later  2023  to document adequate control of the ventricular response\par 3. Addition of beta blockers if required to control the ventricular response\par \par \par \par Hyperlipidemia\par Hyperlipidemia represents a risk factor for the development of atherosclerotic heart disease. However there is no evidence of such to date. A 10/05 stress sestamibi study was normal. The ejection fraction was 83%. A  2017 CT coronary angiogram prior to mitral valve clipping procedure for severe mitral regurgitation was normal. The target LDL level for primary prevention is about 100. The most recent lipid levels are not available for review. Nonpharmacologic therapy, specifically diet and exercise to the extent possible are emphasized as major aspects of treatment.\par \par I have recommended the following:\par 1 low-salt low-fat low-cholesterol diet. Regular aerobic exercise to the extent possible\par 2 laboratory studies including lipid profile through primary care Dr. PRIETO Aguero\par 3. Target LDL level to about 100 as discussed above\par \par \par Shortness of breath\par The working diagnosis dyspnea secondary to increased energy requirements related to Parkinson's disease with decreased mobility and mitral regurgitation  The history is consistent with this diagnosis.  The present cardiac physical emanation is not suggestive of pulmonary venous congestion/acute heart failure \par \par I have recommended the following\par a. Continue the present medical regimen\par \par \par \par The diagnosis, prognosis, risks, options and alternatives were explained at length to the patient and her family (son ). All questions were answered. Issues discussed included  dyspnea , atrial fibrillation, valvular heart disease  Parkinson's disease   noninvasive cardiac testing  and surgical/nonsurgical treatments for mitral regurgitation\par \par Counseling and/or coordination of care\par Time was a significant factor for this patient encounter. Total time spent with the patient was  40   minutes. 50% of the time was devoted to counseling and/or coordination of care.

## 2023-05-19 ENCOUNTER — APPOINTMENT (OUTPATIENT)
Dept: RHEUMATOLOGY | Facility: CLINIC | Age: 68
End: 2023-05-19
Payer: MEDICARE

## 2023-05-19 VITALS
WEIGHT: 96 LBS | DIASTOLIC BLOOD PRESSURE: 62 MMHG | OXYGEN SATURATION: 98 % | HEART RATE: 62 BPM | SYSTOLIC BLOOD PRESSURE: 124 MMHG | BODY MASS INDEX: 17.66 KG/M2 | HEIGHT: 62 IN

## 2023-05-19 PROCEDURE — 99214 OFFICE O/P EST MOD 30 MIN: CPT | Mod: 25

## 2023-05-19 PROCEDURE — 36415 COLL VENOUS BLD VENIPUNCTURE: CPT

## 2023-05-20 NOTE — REASON FOR VISIT
[Consultation] : a consultation visit [Other: _____] : [unfilled] [FreeTextEntry1] : re-establish care for osteoporosis

## 2023-05-20 NOTE — HISTORY OF PRESENT ILLNESS
[FreeTextEntry1] : last visit (TEB) Jan 2021.\par Patient now confused and unable to give proper history.  history obtained from EMR and son.\par \par She used to take Prolia.   Last Prolia 8/25/21. \par \par She takes calcium plus vitamin D daily. \par Last bone density 3/9/21.\par \par Last May she broke her shoulder after she fell on concrete (required surgery).  Many falls but no other fractures.\par 2 years ago she fractured her clavicle.\par \par Son [phone] [place]

## 2023-05-22 LAB
25(OH)D3 SERPL-MCNC: 37.3 NG/ML
ALBUMIN SERPL ELPH-MCNC: 4.5 G/DL
ALP BLD-CCNC: 121 U/L
ALT SERPL-CCNC: 5 U/L
ANION GAP SERPL CALC-SCNC: 16 MMOL/L
AST SERPL-CCNC: 16 U/L
BILIRUB SERPL-MCNC: 0.8 MG/DL
BUN SERPL-MCNC: 24 MG/DL
CALCIUM SERPL-MCNC: 10.8 MG/DL
CALCIUM SERPL-MCNC: 10.8 MG/DL
CHLORIDE SERPL-SCNC: 104 MMOL/L
CO2 SERPL-SCNC: 20 MMOL/L
CREAT SERPL-MCNC: 0.83 MG/DL
EGFR: 77 ML/MIN/1.73M2
GLUCOSE SERPL-MCNC: 106 MG/DL
PARATHYROID HORMONE INTACT: 54 PG/ML
POTASSIUM SERPL-SCNC: 5 MMOL/L
PROT SERPL-MCNC: 7.2 G/DL
SODIUM SERPL-SCNC: 140 MMOL/L
URATE SERPL-MCNC: 3.8 MG/DL

## 2023-05-31 ENCOUNTER — RESULT REVIEW (OUTPATIENT)
Age: 68
End: 2023-05-31

## 2023-06-02 LAB
ALBUMIN MFR SERPL ELPH: 58.6 %
ALBUMIN SERPL-MCNC: 4.2 G/DL
ALBUMIN/GLOB SERPL: 1.4 RATIO
ALPHA1 GLOB MFR SERPL ELPH: 5.1 %
ALPHA1 GLOB SERPL ELPH-MCNC: 0.4 G/DL
ALPHA2 GLOB MFR SERPL ELPH: 7.9 %
ALPHA2 GLOB SERPL ELPH-MCNC: 0.6 G/DL
B-GLOBULIN MFR SERPL ELPH: 11.7 %
B-GLOBULIN SERPL ELPH-MCNC: 0.8 G/DL
COLLAGEN CTX SERPL-MCNC: 445 PG/ML
GAMMA GLOB FLD ELPH-MCNC: 1.2 G/DL
GAMMA GLOB MFR SERPL ELPH: 16.7 %
INTERPRETATION SERPL IEP-IMP: NORMAL
M PROTEIN SPEC IFE-MCNC: NORMAL
OSTEOCALCIN SERPL-MCNC: 15.3 NG/ML
PROT SERPL-MCNC: 7.2 G/DL
PROT SERPL-MCNC: 7.2 G/DL

## 2023-06-06 ENCOUNTER — NON-APPOINTMENT (OUTPATIENT)
Age: 68
End: 2023-06-06

## 2023-06-06 DIAGNOSIS — R60.9 EDEMA, UNSPECIFIED: ICD-10-CM

## 2023-06-08 RX ORDER — TORSEMIDE 10 MG/1
10 TABLET ORAL DAILY
Qty: 90 | Refills: 3 | Status: ACTIVE | COMMUNITY
Start: 2023-06-08 | End: 1900-01-01

## 2023-06-13 ENCOUNTER — RESULT REVIEW (OUTPATIENT)
Age: 68
End: 2023-06-13

## 2023-06-13 ENCOUNTER — APPOINTMENT (OUTPATIENT)
Dept: CARDIOLOGY | Facility: CLINIC | Age: 68
End: 2023-06-13
Payer: MEDICARE

## 2023-06-13 VITALS
OXYGEN SATURATION: 94 % | SYSTOLIC BLOOD PRESSURE: 105 MMHG | HEIGHT: 62 IN | BODY MASS INDEX: 17.32 KG/M2 | WEIGHT: 94.13 LBS | DIASTOLIC BLOOD PRESSURE: 79 MMHG | HEART RATE: 96 BPM

## 2023-06-13 PROCEDURE — 99215 OFFICE O/P EST HI 40 MIN: CPT

## 2023-06-13 PROCEDURE — 36415 COLL VENOUS BLD VENIPUNCTURE: CPT

## 2023-06-14 RX ORDER — MEMANTINE HYDROCHLORIDE 5 MG-10 MG
KIT ORAL
Refills: 0 | Status: ACTIVE | COMMUNITY

## 2023-06-14 NOTE — DISCUSSION/SUMMARY
[FreeTextEntry1] : Congestive heart  failure\par The working diagnosis is mild congestive   heart  failure due to heart failure with preserved ejection fraction (1/23 echocardiogram 64%) secondary to valvular (mitral  regurgitation) heart disease..\par \par I have recommended the following\par a. continue the present medical regimen\par b laboratory studies including electrolytes/renal function are ordered\par c echocardiogram\par d consideration for the administration of SGLT2 inhibition therapy (farxiga /Jardiance \par \par \par Valvular heart disease\par In 12/17 Debra underwent transcatheter mitral valve repair with mitral valve clip for severe mitral regurgitation . 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 Posterior mitral annular calcification was noted. The procedure was difficult due to calcification and commissural extension to P1 and P2 prolapse. \par \par In 10/22  Debra  underwent a second mitral valve clipping procedure by Dr. PRIETO OBANDO. The post procedure transesophageal echocardiogram revealed moderate mitral regurgitation . 2 small iatrogenic atrial septal defects with left to right shunting were seen. The mean gradient across the atrial septal defect decreased from 11 mmHg to 4 mmHg supporting significant decrease in left atrial pressure following the new mitral valve clip. The left ventricular ejection fraction was 60%.\par \par A 1/23 echocardiogram revealed a mitral valve clip with moderate mitral regurgitation.  A left to right intra-atrial shunt due to the prior  transseptal puncture and moderate pulmonary hypertension reflected by pulmonary artery systolic pressure 57 mmHg. was noted..  Severe right/left atrial enlargement was seen.  The left ventricular ejection fraction was 64%\par   \par  The present cardiac examination is consistent with this degree of mitral regurgitation New York Heart Association class III.\par .\par \par \par I have recommended the following:\par 1.  Antibiotic prophylaxis for appropriate dental and surgical procedures\par 2   Continue present medical regimen\par 3   echocardiogram\par \par \par \par Atrial fibrillation\par The working diagnosis is chronic atrial fibrillation secondary to valvular ( mitral regurgitation) heart disease. Atrial fibrillation was first detected in 5/17 on a routine electrocardiogram. The last electrocardiogram demonstrating sinus rhythm was in 5/14.   The duration of atrial fibrillation ,  degree of mitral disease coupled with the left atrial enlargement/increased volume would indicate a low likelihood of successful restoration and maintenance of sinus rhythm. Adequate rate control of the ventricular response appears to be achieved without the administration of AV osmin blocking agents . 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   Concern for potential  trauma/falling make the risk of anticoagulation outweigh its potential benefit.\par \par I have recommended the following:\par 1.Continue  the  present medical regimen\par 2 Consideration for  Zio patch  monitor/event recorder  later  2023  to document adequate control of the ventricular response\par 3. Addition of beta blockers if required to control the ventricular response\par \par \par \par Hyperlipidemia\par Hyperlipidemia represents a risk factor for the development of atherosclerotic heart disease. However there is no evidence of such to date. A 10/05 stress sestamibi study was normal. The ejection fraction was 83%. A  2017 CT coronary angiogram prior to mitral valve clipping procedure for severe mitral regurgitation was normal. The target LDL level for primary prevention is about 100. The most recent lipid levels are not available for review. Nonpharmacologic therapy, specifically diet and exercise to the extent possible are emphasized as major aspects of treatment.\par \par I have recommended the following:\par 1 low-salt low-fat low-cholesterol diet. Regular aerobic exercise to the extent possible\par 2 laboratory studies including lipid profile through primary care Dr. PRIETO Aguero\par 3. Target LDL level to about 100 as discussed above\par \par \par \par \par \par \par The diagnosis, prognosis, risks, options and alternatives were explained at length to the patient and her family (son ). All questions were answered. Issues discussed included  congestive heart failure  dyspnea , atrial fibrillation, valvular heart disease  Parkinson's disease   noninvasive cardiac testing  and surgical/nonsurgical treatments for mitral regurgitation\par \par Counseling and/or coordination of care\par Time was a significant factor for this patient encounter. Total time spent with the patient was  40   minutes. 50% of the time was devoted to counseling and/or coordination of care.

## 2023-06-14 NOTE — HISTORY OF PRESENT ILLNESS
[FreeTextEntry1] : 68 year old female\par unanticipated visit\par \par increased dyspnea was noticed by Debra's aide last week. Torsemide 10mg/day was prescribed with ??benefit.\par no chest pain no dyspnea at  rest.\par \par

## 2023-06-14 NOTE — PHYSICAL EXAM
[Heart Sounds] : normal S1 and S2 [Bowel Sounds] : normal bowel sounds [Abdomen Soft] : soft [] : no rash [Affect] : the affect was normal [Abdomen Tenderness] : non-tender [FreeTextEntry1] : pleasant

## 2023-06-15 ENCOUNTER — TRANSCRIPTION ENCOUNTER (OUTPATIENT)
Age: 68
End: 2023-06-15

## 2023-06-16 LAB
ANION GAP SERPL CALC-SCNC: 16 MMOL/L
BUN SERPL-MCNC: 22 MG/DL
CALCIUM SERPL-MCNC: 10.1 MG/DL
CHLORIDE SERPL-SCNC: 103 MMOL/L
CO2 SERPL-SCNC: 27 MMOL/L
CREAT SERPL-MCNC: 0.9 MG/DL
EGFR: 70 ML/MIN/1.73M2
GLUCOSE SERPL-MCNC: 108 MG/DL
HCT VFR BLD CALC: 47.6 %
HGB BLD-MCNC: 13.2 G/DL
MCHC RBC-ENTMCNC: 22.7 PG
MCHC RBC-ENTMCNC: 27.7 GM/DL
MCV RBC AUTO: 81.9 FL
PLATELET # BLD AUTO: 200 K/UL
POTASSIUM SERPL-SCNC: 4.8 MMOL/L
RBC # BLD: 5.81 M/UL
RBC # FLD: 20.1 %
SODIUM SERPL-SCNC: 145 MMOL/L
WBC # FLD AUTO: 5.49 K/UL

## 2023-07-06 ENCOUNTER — APPOINTMENT (OUTPATIENT)
Dept: RHEUMATOLOGY | Facility: CLINIC | Age: 68
End: 2023-07-06
Payer: MEDICARE

## 2023-07-06 PROCEDURE — 99443: CPT | Mod: 95

## 2023-07-15 RX ORDER — DENOSUMAB 60 MG/ML
60 INJECTION SUBCUTANEOUS
Qty: 1 | Refills: 0 | Status: ACTIVE | COMMUNITY
Start: 2023-07-15

## 2023-07-15 NOTE — ASSESSMENT
[FreeTextEntry1] : Labs reviewed: increase in markers of turnover.  \par Bone density reviewed with son: severe osteoporosis with significant worsening of bone density compared to previous bone density in 2021.\par Will restart Prolia.  R/B reviewed.\par Instructions given to son to call IC to schedule.

## 2023-07-15 NOTE — HISTORY OF PRESENT ILLNESS
[Home] : at home, [unfilled] , at the time of the visit. [Medical Office: ([facility])___] : at the medical office located in  [Verbal consent obtained from patient] : the patient, [unfilled] [FreeTextEntry1] : Patient with dementia.  Spoke with patient's son, her HCP.\par \par No new fracture.\par No new medical issues.

## 2023-07-17 DIAGNOSIS — Z87.898 PERSONAL HISTORY OF OTHER SPECIFIED CONDITIONS: ICD-10-CM

## 2023-08-07 ENCOUNTER — APPOINTMENT (OUTPATIENT)
Dept: CARDIOLOGY | Facility: CLINIC | Age: 68
End: 2023-08-07
Payer: MEDICARE

## 2023-08-07 VITALS
OXYGEN SATURATION: 90 % | BODY MASS INDEX: 16.75 KG/M2 | HEIGHT: 62 IN | HEART RATE: 65 BPM | SYSTOLIC BLOOD PRESSURE: 125 MMHG | DIASTOLIC BLOOD PRESSURE: 91 MMHG | WEIGHT: 91 LBS

## 2023-08-07 DIAGNOSIS — Z86.69 PERSONAL HISTORY OF OTHER DISEASES OF THE NERVOUS SYSTEM AND SENSE ORGANS: ICD-10-CM

## 2023-08-07 PROCEDURE — 99214 OFFICE O/P EST MOD 30 MIN: CPT

## 2023-08-17 PROBLEM — Z86.69 HISTORY OF PARKINSON'S DISEASE: Status: RESOLVED | Noted: 2018-05-14 | Resolved: 2023-08-17

## 2023-08-17 NOTE — DISCUSSION/SUMMARY
[FreeTextEntry1] : Congestive heart  failure The working diagnosis is  congestive   heart  failure due to heart failure with  reduced  ejection fraction ( echocardiogram 45%) secondary to valvular (mitral  regurgitation) heart disease.. The decline in left ventricular systolic performance may be explained by the longstanding chronic moderate-severe mitral regurgitation and possibly increased heart rates in atrial  fibrillation The prognosis is poor  I have recommended the following a. Metoprolol  succinate 25 mg/day with further dose adjustment dictated by tolerance and response b consideration for the administration of SGLT2 inhibition therapy (farxiga /Jardiance ) ace-i/arb , and /or entrsto  dpendent on the patient's response to the above measures hemodynamics and clinical course   Valvular heart disease  / Mitral regurgitation In  Debra underwent transcatheter mitral valve repair with mitral valve clip for severe mitral regurgitation . 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 Posterior mitral annular calcification was noted. The procedure was difficult due to calcification and commissural extension to P1 and P2 prolapse.   In 10/22  Debra  underwent a second mitral valve clipping procedure by Dr. PRIETO Ledezma [facility]. The post procedure transesophageal echocardiogram revealed moderate mitral regurgitation . 2 small iatrogenic atrial septal defects with left to right shunting were seen. The mean gradient across the atrial septal defect decreased from 11 mmHg to 4 mmHg supporting significant decrease in left atrial pressure following the new mitral valve clip. The left ventricular ejection fraction was 60%.  The  echocardiogram revealed the mitral valve clip with moderate-severe mitral regurgitation significant posterior leaflet prolapse was noted.  Moderate tricuspid insufficiency was also seen.  Moderate pulmonary hypertension was reflected by pulmonary artery systolic pressure 54 mmHg.  An intra-atrial septal shunt was seen thought to be related to prior transseptal puncture.  Severe biatrial enlargement was present.  The left ventricular ejection fraction was 45% The decline in left ventricular systolic function is attributed to chronic moderate to severe mitral regurgitation and possibly a tachycardia induced cardiomyopathy.  The present cardiac examination is consistent with this degree of mitral regurgitation New York Heart Association class III. . I have recommended the followin.  Metoprolol succinate 25 mg/day with further dose adjustment dictated by tolerance and response 2.  Antibiotic prophylaxis for appropriate dental and surgical procedures      Atrial fibrillation The working diagnosis is chronic atrial fibrillation secondary to valvular ( mitral regurgitation) heart disease. Atrial fibrillation was first detected in  on a routine electrocardiogram. The last electrocardiogram demonstrating sinus rhythm was in .   The duration of atrial fibrillation ,  degree of mitral disease coupled with the left atrial enlargement/increased volume would indicate a low likelihood of successful restoration and maintenance of sinus rhythm. .   Concern for potential  trauma/falling make the risk of anticoagulation outweigh its potential benefit.  I have recommended the followin.Continue  the  present medical regimen 2 Consideration for  Zio patch  monitor/event recorder  later    or   3  Metoprolol succinate 25 mg/day (see congestive heart failure entry)    Hyperlipidemia Hyperlipidemia represents a risk factor for the development of atherosclerotic heart disease. However there is no evidence of such to date. A 10/05 stress sestamibi study was normal. The ejection fraction was 83%. A  2017 CT coronary angiogram prior to mitral valve clipping procedure for severe mitral regurgitation was normal. The target LDL level for primary prevention is about 100. The most recent lipid levels are not available for review. Nonpharmacologic therapy, specifically diet and exercise to the extent possible are emphasized as major aspects of treatment.  I have recommended the followin low-salt low-fat low-cholesterol diet. Regular aerobic exercise to the extent possible 2 laboratory studies including lipid profile through primary care  3. Target LDL level to about 100 as discussed above       The diagnosis, prognosis, risks, options and alternatives were explained at length to the patient and her family (son ). All questions were answered. Issues discussed included  left  ventricular systolic  dysfunctioncongestive heart failure  dyspnea , atrial fibrillation, valvular heart disease  Parkinson's disease   noninvasive cardiac testing  and surgical/nonsurgical treatments for mitral regurgitation  Counseling and/or coordination of care Time was a significant factor for this patient encounter. Total time spent with the patient was  30   minutes. 50% of the time was devoted to counseling and/or coordination of care.

## 2023-08-17 NOTE — PHYSICAL EXAM
[Heart Sounds] : normal S1 and S2 [Bowel Sounds] : normal bowel sounds [Abdomen Soft] : soft [Abdomen Tenderness] : non-tender [] : no rash [Affect] : the affect was normal [FreeTextEntry1] : pleasant

## 2023-08-17 NOTE — HISTORY OF PRESENT ILLNESS
[FreeTextEntry1] : 68-year-old female Routine follow-up for valvular heart disease/mitral regurgitation/atrial fibrillation/congestive heart failure/left ventricular systolic dysfunction  Dyspnea on exertion is unchanged from in the past.  No orthopnea.  No angina.  No syncope.

## 2023-11-17 ENCOUNTER — APPOINTMENT (OUTPATIENT)
Dept: CARDIOLOGY | Facility: CLINIC | Age: 68
End: 2023-11-17
Payer: MEDICARE

## 2023-11-17 VITALS
HEART RATE: 69 BPM | SYSTOLIC BLOOD PRESSURE: 93 MMHG | OXYGEN SATURATION: 97 % | BODY MASS INDEX: 15.73 KG/M2 | DIASTOLIC BLOOD PRESSURE: 66 MMHG | WEIGHT: 86 LBS

## 2023-11-17 PROCEDURE — 99214 OFFICE O/P EST MOD 30 MIN: CPT

## 2023-11-20 ENCOUNTER — RX RENEWAL (OUTPATIENT)
Age: 68
End: 2023-11-20

## 2023-11-20 RX ORDER — METOPROLOL SUCCINATE 25 MG/1
25 TABLET, EXTENDED RELEASE ORAL DAILY
Qty: 90 | Refills: 3 | Status: ACTIVE | COMMUNITY
Start: 2023-08-07 | End: 1900-01-01

## 2023-12-11 ENCOUNTER — APPOINTMENT (OUTPATIENT)
Dept: RHEUMATOLOGY | Facility: CLINIC | Age: 68
End: 2023-12-11
Payer: MEDICARE

## 2023-12-11 VITALS
OXYGEN SATURATION: 98 % | DIASTOLIC BLOOD PRESSURE: 64 MMHG | WEIGHT: 86 LBS | SYSTOLIC BLOOD PRESSURE: 92 MMHG | HEIGHT: 62 IN | HEART RATE: 68 BPM | BODY MASS INDEX: 15.83 KG/M2

## 2023-12-11 DIAGNOSIS — Z86.39 PERSONAL HISTORY OF OTHER ENDOCRINE, NUTRITIONAL AND METABOLIC DISEASE: ICD-10-CM

## 2023-12-11 DIAGNOSIS — M81.0 AGE-RELATED OSTEOPOROSIS W/OUT CURRENT PATHOLOGICAL FRACTURE: ICD-10-CM

## 2023-12-11 PROCEDURE — 36415 COLL VENOUS BLD VENIPUNCTURE: CPT

## 2023-12-11 PROCEDURE — 99214 OFFICE O/P EST MOD 30 MIN: CPT | Mod: 25

## 2023-12-29 PROBLEM — Z86.39 HISTORY OF VITAMIN D DEFICIENCY: Status: RESOLVED | Noted: 2019-02-01 | Resolved: 2023-12-29

## 2023-12-29 PROBLEM — Z86.39 HISTORY OF HYPERPARATHYROIDISM: Status: RESOLVED | Noted: 2019-02-01 | Resolved: 2023-12-29

## 2023-12-29 PROBLEM — Z86.39 HISTORY OF HYPERCALCEMIA: Status: RESOLVED | Noted: 2023-07-15 | Resolved: 2023-07-15

## 2023-12-29 RX ORDER — CALCIUM CARB/VIT D3/MINERALS 600 MG-400
600-400 TABLET ORAL
Refills: 0 | Status: ACTIVE | COMMUNITY

## 2023-12-29 RX ORDER — DENOSUMAB 60 MG/ML
60 INJECTION SUBCUTANEOUS
Refills: 0 | Status: ACTIVE | COMMUNITY

## 2023-12-29 NOTE — ASSESSMENT
[FreeTextEntry1] : The patient's current disease and medications (Prolia) necessitate close follow up to assess for progression of the disease and laboratory testing to assess for drug toxicity and response to treatment. Failure to follow up in a timely manner can result in laboratory abnormalities, poorly controlled disease, medication side effects or infectious complications.

## 2023-12-29 NOTE — HISTORY OF PRESENT ILLNESS
[FreeTextEntry1] : Patient with dementia.  Spoke with patient's son, her HCP.  No new fractures.  She had a fall a few months ago.  She has a leaky mitral valve.  It has been clipped twice, but it persists.  She doesnt drink much water, but a lot of juice.  Last Prolia injection was on 8/4/23.

## 2024-05-07 ENCOUNTER — APPOINTMENT (OUTPATIENT)
Dept: CARDIOLOGY | Facility: CLINIC | Age: 69
End: 2024-05-07
Payer: MEDICARE

## 2024-05-07 VITALS
WEIGHT: 88 LBS | OXYGEN SATURATION: 94 % | BODY MASS INDEX: 16.2 KG/M2 | SYSTOLIC BLOOD PRESSURE: 101 MMHG | DIASTOLIC BLOOD PRESSURE: 83 MMHG | HEART RATE: 66 BPM | HEIGHT: 62 IN

## 2024-05-07 DIAGNOSIS — Z86.79 PERSONAL HISTORY OF OTHER DISEASES OF THE CIRCULATORY SYSTEM: ICD-10-CM

## 2024-05-07 DIAGNOSIS — I50.9 HEART FAILURE, UNSPECIFIED: ICD-10-CM

## 2024-05-07 DIAGNOSIS — E78.5 HYPERLIPIDEMIA, UNSPECIFIED: ICD-10-CM

## 2024-05-07 DIAGNOSIS — I48.20 CHRONIC ATRIAL FIBRILLATION, UNSP: ICD-10-CM

## 2024-05-07 DIAGNOSIS — I34.0 NONRHEUMATIC MITRAL (VALVE) INSUFFICIENCY: ICD-10-CM

## 2024-05-07 PROCEDURE — 99214 OFFICE O/P EST MOD 30 MIN: CPT

## 2024-05-07 PROCEDURE — 93306 TTE W/DOPPLER COMPLETE: CPT

## 2024-05-16 PROBLEM — Z86.79 HISTORY OF HEART VALVE ABNORMALITY: Status: RESOLVED | Noted: 2018-05-14 | Resolved: 2024-05-16

## 2024-05-16 PROBLEM — I48.20 CHRONIC ATRIAL FIBRILLATION: Status: ACTIVE | Noted: 2019-01-20

## 2024-05-16 PROBLEM — I34.0 MITRAL REGURGITATION: Status: ACTIVE | Noted: 2023-01-18

## 2024-05-16 PROBLEM — E78.5 HYPERLIPIDEMIA: Status: ACTIVE | Noted: 2019-01-20

## 2024-05-16 PROBLEM — I50.9 CHF (CONGESTIVE HEART FAILURE): Status: ACTIVE | Noted: 2023-06-08

## 2024-05-16 NOTE — DISCUSSION/SUMMARY
[FreeTextEntry1] : Congestive heart  failure The working diagnosis is  congestive   heart  failure due to heart failure with  preserved  ejection fraction ( echocardiogram 58%) secondary to valvular (mitral  regurgitation) heart disease.. Left ventricular systolic function has declined as assessed by the  echocardiogram showing a left ventricular ejection fraction 45%.  Beta-blockers metoprolol succinate 25 mg was prescribed.  There has been a favorable response to treatment.  The  echocardiogram showed a left ventricular end-diastolic dimension of 3.9 cm and a left ventricular ejection fraction of 58%.  Some of the variation in ejection fraction may be related to the presence of atrial fibrillation.  The prognosis is poor  I have recommended the following a.  consideration for the administration of SGLT2 inhibition therapy (farxiga /Jardiance ) ace-i/arb , and /or entrsto  dpendent on the patient's hemodynamics and clinical course b. No further cardiac testing for this problem at this time    Valvular heart disease  / Mitral regurgitation In  Debra underwent transcatheter mitral valve repair with mitral valve clip for severe mitral regurgitation . 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 Posterior mitral annular calcification was noted. The procedure was difficult due to calcification and commissural extension to P1 and P2 prolapse.   In 10/22  Debra  underwent a second mitral valve clipping procedure by Dr. PRIETO OBANDO. The post procedure transesophageal echocardiogram revealed moderate mitral regurgitation . 2 small iatrogenic atrial septal defects with left to right shunting were seen. The mean gradient across the atrial septal defect decreased from 11 mmHg to 4 mmHg supporting significant decrease in left atrial pressure following the new mitral valve clip. The left ventricular ejection fraction was 60%.  The  echocardiogram revealed the mitral valve clip with moderate-severe mitral regurgitation significant posterior leaflet prolapse was noted.  .  Severe biatrial enlargement was present.  The left ventricular ejection fraction was 45% The decline in left ventricular systolic function is attributed to chronic moderate to severe mitral regurgitation and possibly a tachycardia induced cardiomyopathy.Metoprolol succinate 25 mg/day was prescribed with a favorable clinical response..  The  echocardiogram showed the mitral valve clip with moderate-severe mitral regurgitation.  Mild pulmonary hypertension was reflected by pulmonary artery systolic pressure of 43 mmHg.  An atrial septal defect with left-to-right shunting and biatrial enlargement was reported.  The left ventricular end-diastolic dimension was 3.9 cm and the left ventricular ejection fraction 58%  Some of the variation of ejection fraction measurements are attributed to the presence of atrial fibrillation  The present cardiac examination is consistent with this degree of mitral regurgitation New York Heart Association class III. . I have recommended the followin.  Continue the present medical regimen 2.  Antibiotic prophylaxis for appropriate dental and surgical procedures 3.  No further cardiac testing for this problem at this time      Atrial fibrillation The working diagnosis is chronic atrial fibrillation secondary to valvular ( mitral regurgitation) heart disease. Atrial fibrillation was first detected in  on a routine electrocardiogram. The last electrocardiogram demonstrating sinus rhythm was in .   The duration of atrial fibrillation ,  degree of mitral disease coupled with the left atrial enlargement/increased volume would indicate a low likelihood of successful restoration and maintenance of sinus rhythm. .   Concern for potential  trauma/falling make the risk of anticoagulation outweigh its potential benefit.  I have recommended the followin.Continue  the  present medical regimen 2 Consideration for  Zio patch  monitor/event recorder  later   or      Hyperlipidemia Hyperlipidemia represents a risk factor for the development of atherosclerotic heart disease. However there is no evidence of such to date. A 10/05 stress sestamibi study was normal. The ejection fraction was 83%. A   CT coronary angiogram prior to mitral valve clipping procedure for severe mitral regurgitation was normal. The target LDL level for primary prevention is about 100.In  the serum cholesterol level was 118 triglycerides 91 HDL 41 and LDL 58.  I have recommended the followin low-salt low-fat low-cholesterol diet. Regular aerobic exercise to the extent possible 2 laboratory studies including lipid profile through primary care  3. Target LDL level to about 100 as discussed above       The diagnosis, prognosis, risks, options and alternatives were explained at length to the patient and her family (son ). All questions were answered. Issues discussed included  left  ventricular systolic  dysfunctioncongestive heart failure  dyspnea , atrial fibrillation, valvular heart disease  Parkinson's disease   noninvasive cardiac testing  and surgical/nonsurgical treatments for mitral regurgitation  Counseling and/or coordination of care Time was a significant factor for this patient encounter. Total time spent with the patient was  30   minutes. 50% of the time was devoted to counseling and/or coordination of care.

## 2024-05-16 NOTE — PHYSICAL EXAM
[Heart Sounds] : normal S1 and S2 [Bowel Sounds] : normal bowel sounds [Abdomen Tenderness] : non-tender [Abdomen Soft] : soft [] : no rash [Affect] : the affect was normal [FreeTextEntry1] : pleasant

## 2024-05-16 NOTE — HISTORY OF PRESENT ILLNESS
[FreeTextEntry1] : 69-year-old female Routine follow-up for atrial fibrillation congestive heart failure mitral regurgitation.  No new complaints.  No hospitalizations since last visit.  No peripheral edema.  No syncope.

## 2024-05-22 RX ORDER — TORSEMIDE 10 MG/1
10 TABLET ORAL DAILY
Qty: 90 | Refills: 3 | Status: ACTIVE | COMMUNITY
Start: 2024-05-22 | End: 1900-01-01

## 2024-06-03 ENCOUNTER — RESULT REVIEW (OUTPATIENT)
Age: 69
End: 2024-06-03

## 2024-06-12 ENCOUNTER — TRANSCRIPTION ENCOUNTER (OUTPATIENT)
Age: 69
End: 2024-06-12

## 2024-08-30 ENCOUNTER — RESULT REVIEW (OUTPATIENT)
Age: 69
End: 2024-08-30

## 2024-09-04 ENCOUNTER — TRANSCRIPTION ENCOUNTER (OUTPATIENT)
Age: 69
End: 2024-09-04

## 2024-11-12 ENCOUNTER — APPOINTMENT (OUTPATIENT)
Dept: CARDIOLOGY | Facility: CLINIC | Age: 69
End: 2024-11-12

## 2024-11-28 NOTE — DISCUSSION/SUMMARY
[FreeTextEntry1] : Valvular heart disease\par In 12/17 Debra underwent transcatheter mitral valve repair with mitral valve clip for severe mitral regurgitation . 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 Posterior mitral annular calcification was noted. The procedure was difficult due to calcification and commissural extension to P1 and P2 prolapse. The 12/18 transthoracic echocardiogram demonstrated a reduction of severe mitral regurgitation to moderate levels. The left atrium was severely enlarged at 6.0 cm. The mitral valve clip device was noted. There was residual posterior leaflet prolapse. Mioderate pulmonary hypertension was reflected by a pulmonary artery systolic pressure of 50  mmHg. The left ventricle ejection fraction was 55%. The present cardiac examination is consistent with this degree of mitral regurgitation and a euvolemic state [place] Heart Association class II .Debra is at increased risk for pulmonary venous congestion. If tolerated. low-dose diuretics may provide prophylactic benefit .\par \par I have recommended the following:\par 1.  Lasix 20 mg/day \par 2   Antibiotic prophylaxis for appropriate dental and surgical procedures\par 3   Anticipate echocardiogram later 2019\par 4. Followup with Dr. KATHRYN Aponte [facility]\par \par \par Atrial fibrillation\par The working diagnosis is chronic atrial fibrillation secondary to valvular ( mitral regurgitation) heart disease. Atrial fibrillation was first detected in 5/17 on a routine electrocardiogram. The last electrocardiogram demonstrating sinus rhythm was in 5/14. As such, the duration of atrial fibrillation is uncertain. The 12/18 echocardiogram demonstrated moderate mitral insufficiency with severe left atrial dilatation 6.0 cm) and severe increased volume index (363 mL). The duration of atrial fibrillation ,  degree of mitral disease coupled with the left atrial enlargement/increased volume would indicate a low likelihood of successful restoration and maintenance of sinus rhythm. Adequate rate control of the ventricular response appears to be achieved without the administration of AV osmin blocking agents . 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 A low "[id number]" score and concerns for the potential of trauma/falling make the risk of anticoagulation outweigh its potential benefit.\par \par I have recommended the following:\par 1. No attempt to restore/maintain sinus rhythm\par 2 Holter monitor/event recorder later 2019 to document adequate control of the ventricular response\par 3. Addition of beta blockers if required to control the ventricular response\par \par \par \par Hyperlipidemia\par Hyperlipidemia represents a risk factor for the development of atherosclerotic heart disease. However there is no evidence of such to date. A 10/05 stress sestamibi study was normal. The ejection fraction was 83%. 8 2017 CT coronary angiogram prior to mitral valve clipping procedure for severe mitral regurgitation was normal. The target LDL level for primary prevention is about 100. The most recent lipid levels are not available for review. Nonpharmacologic therapy, specifically diet and exercise to the extent possible are emphasized as major aspects of treatment.\par \par I have recommended the following:\par 1 low-salt low-fat low-cholesterol diet. Regular aerobic exercise to the extent possible\par 2 laboratory studies including lipid profile through primary care Dr. PRIETO Aguero\par 3. Target LDL level to about 100 as discussed above show